# Patient Record
Sex: MALE | Race: BLACK OR AFRICAN AMERICAN | Employment: FULL TIME | URBAN - METROPOLITAN AREA
[De-identification: names, ages, dates, MRNs, and addresses within clinical notes are randomized per-mention and may not be internally consistent; named-entity substitution may affect disease eponyms.]

---

## 2019-10-23 ENCOUNTER — OP HISTORICAL/CONVERTED ENCOUNTER (OUTPATIENT)
Dept: OTHER | Age: 39
End: 2019-10-23

## 2020-06-16 ENCOUNTER — ED HISTORICAL/CONVERTED ENCOUNTER (OUTPATIENT)
Dept: OTHER | Age: 40
End: 2020-06-16

## 2020-06-24 ENCOUNTER — OP HISTORICAL/CONVERTED ENCOUNTER (OUTPATIENT)
Dept: OTHER | Age: 40
End: 2020-06-24

## 2021-06-02 ENCOUNTER — APPOINTMENT (OUTPATIENT)
Dept: CT IMAGING | Age: 41
DRG: 871 | End: 2021-06-02
Attending: EMERGENCY MEDICINE
Payer: OTHER GOVERNMENT

## 2021-06-02 ENCOUNTER — APPOINTMENT (OUTPATIENT)
Dept: GENERAL RADIOLOGY | Age: 41
DRG: 871 | End: 2021-06-02
Attending: EMERGENCY MEDICINE
Payer: OTHER GOVERNMENT

## 2021-06-02 ENCOUNTER — APPOINTMENT (OUTPATIENT)
Dept: NUCLEAR MEDICINE | Age: 41
DRG: 871 | End: 2021-06-02
Attending: EMERGENCY MEDICINE
Payer: OTHER GOVERNMENT

## 2021-06-02 ENCOUNTER — HOSPITAL ENCOUNTER (INPATIENT)
Age: 41
LOS: 4 days | Discharge: HOME | DRG: 871 | End: 2021-06-06
Attending: EMERGENCY MEDICINE | Admitting: HOSPITALIST
Payer: OTHER GOVERNMENT

## 2021-06-02 DIAGNOSIS — N17.9 ACUTE RENAL FAILURE, UNSPECIFIED ACUTE RENAL FAILURE TYPE (HCC): ICD-10-CM

## 2021-06-02 DIAGNOSIS — U07.1 COVID-19: Primary | ICD-10-CM

## 2021-06-02 PROBLEM — J12.82 PNEUMONIA DUE TO COVID-19 VIRUS: Status: ACTIVE | Noted: 2021-06-02

## 2021-06-02 PROBLEM — J96.90 RESPIRATORY FAILURE (HCC): Status: ACTIVE | Noted: 2021-06-02

## 2021-06-02 LAB
ALBUMIN SERPL-MCNC: 3 G/DL (ref 3.5–5)
ALBUMIN/GLOB SERPL: 0.6 {RATIO} (ref 1.1–2.2)
ALP SERPL-CCNC: 52 U/L (ref 45–117)
ALT SERPL-CCNC: 98 U/L (ref 12–78)
ANION GAP SERPL CALC-SCNC: 9 MMOL/L (ref 5–15)
APTT PPP: 35 SEC (ref 21.2–34.1)
AST SERPL W P-5'-P-CCNC: 236 U/L (ref 15–37)
BASOPHILS # BLD: 0 K/UL (ref 0–0.1)
BASOPHILS NFR BLD: 0 % (ref 0–1)
BILIRUB SERPL-MCNC: 0.7 MG/DL (ref 0.2–1)
BNP SERPL-MCNC: 50 PG/ML
BUN SERPL-MCNC: 32 MG/DL (ref 6–20)
BUN/CREAT SERPL: 6 (ref 12–20)
CA-I BLD-MCNC: 8.1 MG/DL (ref 8.5–10.1)
CHLORIDE SERPL-SCNC: 99 MMOL/L (ref 97–108)
CO2 SERPL-SCNC: 26 MMOL/L (ref 21–32)
CREAT SERPL-MCNC: 5 MG/DL (ref 0.7–1.3)
D DIMER PPP FEU-MCNC: 1.04 UG/ML(FEU)
DIFFERENTIAL METHOD BLD: ABNORMAL
EOSINOPHIL # BLD: 0 K/UL (ref 0–0.4)
EOSINOPHIL NFR BLD: 0 % (ref 0–7)
ERYTHROCYTE [DISTWIDTH] IN BLOOD BY AUTOMATED COUNT: 14.5 % (ref 11.5–14.5)
GLOBULIN SER CALC-MCNC: 4.9 G/DL (ref 2–4)
GLUCOSE SERPL-MCNC: 88 MG/DL (ref 65–100)
HCT VFR BLD AUTO: 44.4 % (ref 36.6–50.3)
HGB BLD-MCNC: 15.5 G/DL (ref 12.1–17)
IMM GRANULOCYTES # BLD AUTO: 0.1 K/UL (ref 0–0.04)
IMM GRANULOCYTES NFR BLD AUTO: 1 % (ref 0–0.5)
INR PPP: 1.1 (ref 0.9–1.1)
LACTATE SERPL-SCNC: 1.2 MMOL/L (ref 0.4–2)
LDH SERPL L TO P-CCNC: 929 U/L (ref 85–241)
LYMPHOCYTES # BLD: 1.2 K/UL (ref 0.8–3.5)
LYMPHOCYTES NFR BLD: 11 % (ref 12–49)
MCH RBC QN AUTO: 28.7 PG (ref 26–34)
MCHC RBC AUTO-ENTMCNC: 34.9 G/DL (ref 30–36.5)
MCV RBC AUTO: 82.2 FL (ref 80–99)
MONOCYTES # BLD: 0.6 K/UL (ref 0–1)
MONOCYTES NFR BLD: 5 % (ref 5–13)
NEUTS SEG # BLD: 8.7 K/UL (ref 1.8–8)
NEUTS SEG NFR BLD: 83 % (ref 32–75)
NRBC # BLD: 0 K/UL (ref 0–0.01)
NRBC BLD-RTO: 0 PER 100 WBC
PLATELET # BLD AUTO: 175 K/UL (ref 150–400)
POTASSIUM SERPL-SCNC: 4.4 MMOL/L (ref 3.5–5.1)
PROCALCITONIN SERPL-MCNC: 1.63 NG/ML
PROT SERPL-MCNC: 7.9 G/DL (ref 6.4–8.2)
PROTHROMBIN TIME: 14.2 SEC (ref 11.9–14.7)
RBC # BLD AUTO: 5.4 M/UL (ref 4.1–5.7)
SODIUM SERPL-SCNC: 134 MMOL/L (ref 136–145)
THERAPEUTIC RANGE,PTTT: ABNORMAL SEC (ref 82–109)
TROPONIN I SERPL-MCNC: <0.05 NG/ML
WBC # BLD AUTO: 10.5 K/UL (ref 4.1–11.1)

## 2021-06-02 PROCEDURE — 80053 COMPREHEN METABOLIC PANEL: CPT

## 2021-06-02 PROCEDURE — 99284 EMERGENCY DEPT VISIT MOD MDM: CPT

## 2021-06-02 PROCEDURE — 65270000029 HC RM PRIVATE

## 2021-06-02 PROCEDURE — 83615 LACTATE (LD) (LDH) ENZYME: CPT

## 2021-06-02 PROCEDURE — 84484 ASSAY OF TROPONIN QUANT: CPT

## 2021-06-02 PROCEDURE — 36415 COLL VENOUS BLD VENIPUNCTURE: CPT

## 2021-06-02 PROCEDURE — 78580 LUNG PERFUSION IMAGING: CPT

## 2021-06-02 PROCEDURE — 80069 RENAL FUNCTION PANEL: CPT

## 2021-06-02 PROCEDURE — 96374 THER/PROPH/DIAG INJ IV PUSH: CPT

## 2021-06-02 PROCEDURE — A9540 TC99M MAA: HCPCS

## 2021-06-02 PROCEDURE — 71045 X-RAY EXAM CHEST 1 VIEW: CPT

## 2021-06-02 PROCEDURE — 82550 ASSAY OF CK (CPK): CPT

## 2021-06-02 PROCEDURE — 85610 PROTHROMBIN TIME: CPT

## 2021-06-02 PROCEDURE — 80061 LIPID PANEL: CPT

## 2021-06-02 PROCEDURE — 85025 COMPLETE CBC W/AUTO DIFF WBC: CPT

## 2021-06-02 PROCEDURE — 82728 ASSAY OF FERRITIN: CPT

## 2021-06-02 PROCEDURE — 74011250636 HC RX REV CODE- 250/636: Performed by: EMERGENCY MEDICINE

## 2021-06-02 PROCEDURE — 87040 BLOOD CULTURE FOR BACTERIA: CPT

## 2021-06-02 PROCEDURE — 84145 PROCALCITONIN (PCT): CPT

## 2021-06-02 PROCEDURE — 85379 FIBRIN DEGRADATION QUANT: CPT

## 2021-06-02 PROCEDURE — 74011250637 HC RX REV CODE- 250/637: Performed by: EMERGENCY MEDICINE

## 2021-06-02 PROCEDURE — 85730 THROMBOPLASTIN TIME PARTIAL: CPT

## 2021-06-02 PROCEDURE — 93005 ELECTROCARDIOGRAM TRACING: CPT

## 2021-06-02 PROCEDURE — 83880 ASSAY OF NATRIURETIC PEPTIDE: CPT

## 2021-06-02 PROCEDURE — 83605 ASSAY OF LACTIC ACID: CPT

## 2021-06-02 RX ORDER — ONDANSETRON 2 MG/ML
4 INJECTION INTRAMUSCULAR; INTRAVENOUS
Status: DISCONTINUED | OUTPATIENT
Start: 2021-06-02 | End: 2021-06-06 | Stop reason: HOSPADM

## 2021-06-02 RX ORDER — ACETAMINOPHEN 500 MG
1000 TABLET ORAL ONCE
Status: COMPLETED | OUTPATIENT
Start: 2021-06-02 | End: 2021-06-02

## 2021-06-02 RX ORDER — HEPARIN SODIUM 5000 [USP'U]/ML
5000 INJECTION, SOLUTION INTRAVENOUS; SUBCUTANEOUS EVERY 8 HOURS
Status: DISCONTINUED | OUTPATIENT
Start: 2021-06-02 | End: 2021-06-03

## 2021-06-02 RX ORDER — SODIUM CHLORIDE 0.9 % (FLUSH) 0.9 %
5-40 SYRINGE (ML) INJECTION AS NEEDED
Status: DISCONTINUED | OUTPATIENT
Start: 2021-06-02 | End: 2021-06-06 | Stop reason: HOSPADM

## 2021-06-02 RX ORDER — SODIUM CHLORIDE 0.9 % (FLUSH) 0.9 %
5-10 SYRINGE (ML) INJECTION AS NEEDED
Status: DISCONTINUED | OUTPATIENT
Start: 2021-06-02 | End: 2021-06-06 | Stop reason: HOSPADM

## 2021-06-02 RX ORDER — ONDANSETRON 4 MG/1
4 TABLET, ORALLY DISINTEGRATING ORAL
Status: DISCONTINUED | OUTPATIENT
Start: 2021-06-02 | End: 2021-06-06 | Stop reason: HOSPADM

## 2021-06-02 RX ORDER — ACETAMINOPHEN 650 MG/1
650 SUPPOSITORY RECTAL
Status: DISCONTINUED | OUTPATIENT
Start: 2021-06-02 | End: 2021-06-03

## 2021-06-02 RX ORDER — DEXAMETHASONE 4 MG/1
6 TABLET ORAL DAILY
Status: DISCONTINUED | OUTPATIENT
Start: 2021-06-02 | End: 2021-06-03

## 2021-06-02 RX ORDER — DEXAMETHASONE SODIUM PHOSPHATE 4 MG/ML
10 INJECTION, SOLUTION INTRA-ARTICULAR; INTRALESIONAL; INTRAMUSCULAR; INTRAVENOUS; SOFT TISSUE ONCE
Status: COMPLETED | OUTPATIENT
Start: 2021-06-02 | End: 2021-06-02

## 2021-06-02 RX ORDER — SODIUM CHLORIDE 0.9 % (FLUSH) 0.9 %
5-40 SYRINGE (ML) INJECTION EVERY 8 HOURS
Status: DISCONTINUED | OUTPATIENT
Start: 2021-06-02 | End: 2021-06-06 | Stop reason: HOSPADM

## 2021-06-02 RX ORDER — ACETAMINOPHEN 325 MG/1
650 TABLET ORAL
Status: DISCONTINUED | OUTPATIENT
Start: 2021-06-02 | End: 2021-06-03

## 2021-06-02 RX ADMIN — DEXAMETHASONE SODIUM PHOSPHATE 10 MG: 4 INJECTION, SOLUTION INTRA-ARTICULAR; INTRALESIONAL; INTRAMUSCULAR; INTRAVENOUS; SOFT TISSUE at 17:18

## 2021-06-02 RX ADMIN — ACETAMINOPHEN 1000 MG: 500 TABLET ORAL at 17:12

## 2021-06-02 RX ADMIN — Medication 5 ML: at 17:14

## 2021-06-02 RX ADMIN — SODIUM CHLORIDE 1000 ML: 9 INJECTION, SOLUTION INTRAVENOUS at 17:09

## 2021-06-02 NOTE — ED PROVIDER NOTES
EMERGENCY DEPARTMENT HISTORY AND PHYSICAL EXAM        Date: 6/2/2021  Patient Name: Jo Ann Murray    History of Presenting Illness     Chief Complaint   Patient presents with    Positive For Covid-19    Shortness of Breath       History Provided By: Patient    HPI: Jo Ann Murray, 47 y.o. male with history of hypertension who presents with difficulty breathing and chest pain. States that he was diagnosed with Covid 2 days ago. His symptoms started about 6 days ago with decreased taste. He then developed fever and chills. He has had mild cough and shortness of breath as well. States that he developed chest pain 2 days ago. Pain is in the center of his chest, nonradiating achy, moderate, and without exacerbating symptoms. He did have tingling in his hands as well that started in the last couple days. Denies history of DVT/PE, leg edema, or other symptoms. PCP: None        Past History     Past Medical History:  Past Medical History:   Diagnosis Date    Arthritis     Hypertension        Past Surgical History:  History reviewed. No pertinent surgical history. Family History:  History reviewed. No pertinent family history. Social History:  Social History     Tobacco Use    Smoking status: Never Smoker    Smokeless tobacco: Never Used   Substance Use Topics    Alcohol use: Not on file    Drug use: Not on file       Allergies:  No Known Allergies      Review of Systems   Review of Systems   Constitutional: Positive for chills. Negative for fever. HENT: Negative for congestion. Eyes: Negative for visual disturbance. Respiratory: Positive for cough and shortness of breath. Cardiovascular: Positive for chest pain. Gastrointestinal: Negative for abdominal pain. Genitourinary: Negative for dysuria. Musculoskeletal: Negative for arthralgias. Skin: Negative for rash. Neurological: Negative for headaches. Physical Exam   Constitutional: No acute distress. Well-nourished.   Skin: No rash.  ENT: No rhinorrhea. No cough. Head is normocephalic and atraumatic. Eye: No proptosis or conjunctival injections. Respiratory: No apparent respiratory distress. Lung sounds are clear. Gastrointestinal: Nondistended. Musculoskeletal: No obvious bony deformities. No leg edema. Psychiatric: Cooperative. Appropriate mood and affect. Diagnostic Study Results     Labs -     Recent Results (from the past 24 hour(s))   METABOLIC PANEL, COMPREHENSIVE    Collection Time: 06/02/21  4:30 PM   Result Value Ref Range    Sodium 134 (L) 136 - 145 mmol/L    Potassium 4.4 3.5 - 5.1 mmol/L    Chloride 99 97 - 108 mmol/L    CO2 26 21 - 32 mmol/L    Anion gap 9 5 - 15 mmol/L    Glucose 88 65 - 100 mg/dL    BUN 32 (H) 6 - 20 mg/dL    Creatinine 5.00 (H) 0.70 - 1.30 mg/dL    BUN/Creatinine ratio 6 (L) 12 - 20      GFR est AA 16 (L) >60 ml/min/1.73m2    GFR est non-AA 13 (L) >60 ml/min/1.73m2    Calcium 8.1 (L) 8.5 - 10.1 mg/dL    Bilirubin, total 0.7 0.2 - 1.0 mg/dL    AST (SGOT) 236 (H) 15 - 37 U/L    ALT (SGPT) 98 (H) 12 - 78 U/L    Alk. phosphatase 52 45 - 117 U/L    Protein, total 7.9 6.4 - 8.2 g/dL    Albumin 3.0 (L) 3.5 - 5.0 g/dL    Globulin 4.9 (H) 2.0 - 4.0 g/dL    A-G Ratio 0.6 (L) 1.1 - 2.2     CBC WITH AUTOMATED DIFF    Collection Time: 06/02/21  4:30 PM   Result Value Ref Range    WBC 10.5 4.1 - 11.1 K/uL    RBC 5.40 4. 10 - 5.70 M/uL    HGB 15.5 12.1 - 17.0 g/dL    HCT 44.4 36.6 - 50.3 %    MCV 82.2 80.0 - 99.0 FL    MCH 28.7 26.0 - 34.0 PG    MCHC 34.9 30.0 - 36.5 g/dL    RDW 14.5 11.5 - 14.5 %    PLATELET 243 647 - 276 K/uL    NRBC 0.0 0.0  WBC    ABSOLUTE NRBC 0.00 0.00 - 0.01 K/uL    NEUTROPHILS 83 (H) 32 - 75 %    LYMPHOCYTES 11 (L) 12 - 49 %    MONOCYTES 5 5 - 13 %    EOSINOPHILS 0 0 - 7 %    BASOPHILS 0 0 - 1 %    IMMATURE GRANULOCYTES 1 (H) 0 - 0.5 %    ABS. NEUTROPHILS 8.7 (H) 1.8 - 8.0 K/UL    ABS. LYMPHOCYTES 1.2 0.8 - 3.5 K/UL    ABS. MONOCYTES 0.6 0.0 - 1.0 K/UL    ABS.  EOSINOPHILS 0.0 0.0 - 0.4 K/UL    ABS. BASOPHILS 0.0 0.0 - 0.1 K/UL    ABS. IMM. GRANS. 0.1 (H) 0.00 - 0.04 K/UL    DF AUTOMATED     TROPONIN I    Collection Time: 06/02/21  4:30 PM   Result Value Ref Range    Troponin-I, Qt. <0.05 <0.05 ng/mL   LD    Collection Time: 06/02/21  4:30 PM   Result Value Ref Range     (H) 85 - 241 U/L   PROTHROMBIN TIME + INR    Collection Time: 06/02/21  4:30 PM   Result Value Ref Range    Prothrombin time 14.2 11.9 - 14.7 sec    INR 1.1 0.9 - 1.1     PTT    Collection Time: 06/02/21  4:30 PM   Result Value Ref Range    aPTT 35.0 (H) 21.2 - 34.1 sec    aPTT, therapeutic range   82 - 109 sec   BNP    Collection Time: 06/02/21  4:45 PM   Result Value Ref Range    NT pro-BNP 50 <125 pg/mL   D DIMER    Collection Time: 06/02/21  4:45 PM   Result Value Ref Range    D DIMER 1.04 (H) <0.50 ug/ml(FEU)   PROCALCITONIN    Collection Time: 06/02/21  4:45 PM   Result Value Ref Range    Procalcitonin 1.63 (H) 0 ng/mL   LACTIC ACID    Collection Time: 06/02/21  4:45 PM   Result Value Ref Range    Lactic acid 1.2 0.4 - 2.0 mmol/L       Radiologic Studies -   NM LUNG SCAN PERF   Final Result   Intermediate probability of pulmonary embolism. XR CHEST PORT   Final Result        CT Results  (Last 48 hours)    None        CXR Results  (Last 48 hours)               06/02/21 1644  XR CHEST PORT Final result    Narrative:  Chest single view. Hazy reticular markings predominant central lungs. Consider   infectious/inflammatory process. Cardiac and mediastinal structures magnified on   this AP view. No pneumothorax or sizable pleural effusion. Medical Decision Making and ED Course     I reviewed the available vital signs, nursing notes, past medical history, past surgical history, family history, and social history. Vital Signs - Reviewed the patient's vital signs.   Patient Vitals for the past 12 hrs:   Temp Pulse Resp BP SpO2   06/02/21 1755  94 (!) 40 (!) 144/79 95 %   06/02/21 1616     92 %   06/02/21 1609 (!) 100.9 °F (38.3 °C) 100 (!) 92 137/84 92 %       EKG interpretation: Obtained on 6/2/2021 at 1606. Read at 1616. Normal sinus rhythm at rate of 98 bpm.  WV interval 160 ms. QRS duration 92 ms. QTc 4 3 6 ms. Right axis deviation. Multiple T wave inversions. No ST segment abnormalities. Medical Decision Making:   Presented with chest pain difficulty breathing. Patient has known Covid 19. The differential diagnosis is COVID-19, pneumonia, ACS, pulmonary embolism. Work-up shows acute kidney injury. He did get a VQ scan to rule out a PE which showed intermediate risk. He will be anticoagulated for this. Unclear if he actually has a pulmonary embolism or not. He could not get a PET scan with CT due to his creatinine of 5. Patient was given IV fluids Decadron. He was admitted to the hospitalist.         Disposition     Admitted to Dr. Guanako Powell    Diagnosis     Clinical impression:   1. COVID-19    2. Acute renal failure, unspecified acute renal failure type [facility])           Attestation:  Please note that this dictation was completed with [employer], the computer voice recognition software. Quite often unanticipated grammatical, syntax, homophones, and other interpretive errors are inadvertently transcribed by the computer software. Please disregard these errors. Please excuse any errors that have escaped final proofreading. Thank you.   Raudel Walker, DO

## 2021-06-02 NOTE — ED TRIAGE NOTES
Patient was told he was covid positive on Monday.  Here because he feels short of breath and hands are tingling

## 2021-06-03 ENCOUNTER — APPOINTMENT (OUTPATIENT)
Dept: ULTRASOUND IMAGING | Age: 41
DRG: 871 | End: 2021-06-03
Attending: HOSPITALIST
Payer: OTHER GOVERNMENT

## 2021-06-03 LAB
25(OH)D3 SERPL-MCNC: <9 NG/ML (ref 30–100)
ALBUMIN SERPL-MCNC: 2.6 G/DL (ref 3.5–5)
ALBUMIN SERPL-MCNC: 2.7 G/DL (ref 3.5–5)
ALBUMIN/GLOB SERPL: 0.7 {RATIO} (ref 1.1–2.2)
ALP SERPL-CCNC: 54 U/L (ref 45–117)
ALT SERPL-CCNC: 98 U/L (ref 12–78)
AMPHET UR QL SCN: NEGATIVE
ANION GAP SERPL CALC-SCNC: 11 MMOL/L (ref 5–15)
ANION GAP SERPL CALC-SCNC: 9 MMOL/L (ref 5–15)
APPEARANCE UR: ABNORMAL
AST SERPL W P-5'-P-CCNC: 188 U/L (ref 15–37)
BACTERIA URNS QL MICRO: NEGATIVE /HPF
BARBITURATES UR QL SCN: NEGATIVE
BASOPHILS # BLD: 0 K/UL (ref 0–0.1)
BASOPHILS NFR BLD: 1 % (ref 0–1)
BENZODIAZ UR QL: NEGATIVE
BILIRUB SERPL-MCNC: 0.5 MG/DL (ref 0.2–1)
BILIRUB UR QL: NEGATIVE
BUN SERPL-MCNC: 37 MG/DL (ref 6–20)
BUN SERPL-MCNC: 44 MG/DL (ref 6–20)
BUN/CREAT SERPL: 7 (ref 12–20)
BUN/CREAT SERPL: 8 (ref 12–20)
CA-I BLD-MCNC: 7.8 MG/DL (ref 8.5–10.1)
CA-I BLD-MCNC: 7.8 MG/DL (ref 8.5–10.1)
CA-I BLD-MCNC: 8.2 MG/DL (ref 8.5–10.1)
CANNABINOIDS UR QL SCN: NEGATIVE
CHLORIDE SERPL-SCNC: 102 MMOL/L (ref 97–108)
CHLORIDE SERPL-SCNC: 102 MMOL/L (ref 97–108)
CHOLEST SERPL-MCNC: 118 MG/DL
CK SERPL-CCNC: 4840 U/L (ref 39–308)
CK SERPL-CCNC: 6267 U/L (ref 39–308)
CO2 SERPL-SCNC: 22 MMOL/L (ref 21–32)
CO2 SERPL-SCNC: 25 MMOL/L (ref 21–32)
COCAINE UR QL SCN: NEGATIVE
COLOR UR: ABNORMAL
COVID-19 RAPID TEST, COVR: NOT DETECTED
CREAT SERPL-MCNC: 5.55 MG/DL (ref 0.7–1.3)
CREAT SERPL-MCNC: 5.55 MG/DL (ref 0.7–1.3)
CREAT UR-MCNC: 249 MG/DL
CRP SERPL-MCNC: 29.1 MG/DL (ref 0–0.6)
D DIMER PPP FEU-MCNC: 0.97 UG/ML(FEU)
DIFFERENTIAL METHOD BLD: ABNORMAL
DRUG SCRN COMMENT,DRGCM: NORMAL
EOSINOPHIL # BLD: 0.1 K/UL (ref 0–0.4)
EOSINOPHIL NFR BLD: 1 % (ref 0–7)
ERYTHROCYTE [DISTWIDTH] IN BLOOD BY AUTOMATED COUNT: 17.7 % (ref 11.5–14.5)
FERRITIN SERPL-MCNC: 1523 NG/ML (ref 26–388)
FERRITIN SERPL-MCNC: 26 NG/ML (ref 26–388)
FIBRINOGEN PPP-MCNC: 791 MG/DL (ref 220–535)
GLOBULIN SER CALC-MCNC: 4 G/DL (ref 2–4)
GLUCOSE BLD STRIP.AUTO-MCNC: 124 MG/DL (ref 65–117)
GLUCOSE BLD STRIP.AUTO-MCNC: 159 MG/DL (ref 65–117)
GLUCOSE BLD STRIP.AUTO-MCNC: 176 MG/DL (ref 65–117)
GLUCOSE BLD STRIP.AUTO-MCNC: 200 MG/DL (ref 65–117)
GLUCOSE SERPL-MCNC: 115 MG/DL (ref 65–100)
GLUCOSE SERPL-MCNC: 125 MG/DL (ref 65–100)
GLUCOSE UR STRIP.AUTO-MCNC: NEGATIVE MG/DL
HCT VFR BLD AUTO: 37.1 % (ref 36.6–50.3)
HDLC SERPL-MCNC: 26 MG/DL
HDLC SERPL: 4.5 {RATIO} (ref 0–5)
HGB BLD-MCNC: 11.1 G/DL (ref 12.1–17)
HGB UR QL STRIP: ABNORMAL
IMM GRANULOCYTES # BLD AUTO: 0 K/UL (ref 0–0.04)
IMM GRANULOCYTES NFR BLD AUTO: 0 % (ref 0–0.5)
KETONES UR QL STRIP.AUTO: NEGATIVE MG/DL
LACTATE SERPL-SCNC: 1.2 MMOL/L (ref 0.4–2)
LDH SERPL L TO P-CCNC: 872 U/L (ref 85–241)
LDLC SERPL CALC-MCNC: 62 MG/DL (ref 0–100)
LEUKOCYTE ESTERASE UR QL STRIP.AUTO: NEGATIVE
LIPID PROFILE,FLP: ABNORMAL
LYMPHOCYTES # BLD: 1.8 K/UL (ref 0.8–3.5)
LYMPHOCYTES NFR BLD: 32 % (ref 12–49)
MAGNESIUM SERPL-MCNC: 1.9 MG/DL (ref 1.6–2.4)
MCH RBC QN AUTO: 24.9 PG (ref 26–34)
MCHC RBC AUTO-ENTMCNC: 29.9 G/DL (ref 30–36.5)
MCV RBC AUTO: 83.2 FL (ref 80–99)
METHADONE UR QL: NEGATIVE
MONOCYTES # BLD: 0.5 K/UL (ref 0–1)
MONOCYTES NFR BLD: 10 % (ref 5–13)
MUCOUS THREADS URNS QL MICRO: ABNORMAL /LPF
NEUTS SEG # BLD: 3.1 K/UL (ref 1.8–8)
NEUTS SEG NFR BLD: 56 % (ref 32–75)
NITRITE UR QL STRIP.AUTO: NEGATIVE
NRBC # BLD: 0.02 K/UL (ref 0–0.01)
NRBC BLD-RTO: 0.4 PER 100 WBC
OPIATES UR QL: NEGATIVE
PCP UR QL: NEGATIVE
PERFORMED BY, TECHID: ABNORMAL
PH UR STRIP: 5 [PH] (ref 5–8)
PHOSPHATE SERPL-MCNC: 4.4 MG/DL (ref 2.6–4.7)
PLATELET # BLD AUTO: 326 K/UL (ref 150–400)
PMV BLD AUTO: 10.7 FL (ref 8.9–12.9)
POTASSIUM SERPL-SCNC: 4.4 MMOL/L (ref 3.5–5.1)
POTASSIUM SERPL-SCNC: 4.7 MMOL/L (ref 3.5–5.1)
POTASSIUM UR-SCNC: 22 MMOL/L
PROT SERPL-MCNC: 6.7 G/DL (ref 6.4–8.2)
PROT UR STRIP-MCNC: 100 MG/DL
PTH-INTACT SERPL-MCNC: 251.7 PG/ML (ref 18.4–88)
RBC # BLD AUTO: 4.46 M/UL (ref 4.1–5.7)
RBC #/AREA URNS HPF: ABNORMAL /HPF (ref 0–5)
SARS-COV-2, COV2: NORMAL
SODIUM SERPL-SCNC: 135 MMOL/L (ref 136–145)
SODIUM SERPL-SCNC: 136 MMOL/L (ref 136–145)
SODIUM UR-SCNC: 20 MMOL/L
SP GR UR REFRACTOMETRY: 1.01 (ref 1–1.03)
SPECIMEN SOURCE: NORMAL
TRIGL SERPL-MCNC: 150 MG/DL (ref ?–150)
TROPONIN I SERPL-MCNC: <0.05 NG/ML
TSH SERPL DL<=0.05 MIU/L-ACNC: 0.77 UIU/ML (ref 0.36–3.74)
UROBILINOGEN UR QL STRIP.AUTO: 0.1 EU/DL (ref 0.1–1)
VLDLC SERPL CALC-MCNC: 30 MG/DL
WBC # BLD AUTO: 5.5 K/UL (ref 4.1–11.1)
WBC URNS QL MICRO: ABNORMAL /HPF (ref 0–4)

## 2021-06-03 PROCEDURE — 81001 URINALYSIS AUTO W/SCOPE: CPT

## 2021-06-03 PROCEDURE — 65270000029 HC RM PRIVATE

## 2021-06-03 PROCEDURE — 74011250636 HC RX REV CODE- 250/636: Performed by: HOSPITALIST

## 2021-06-03 PROCEDURE — 83605 ASSAY OF LACTIC ACID: CPT

## 2021-06-03 PROCEDURE — 74011250636 HC RX REV CODE- 250/636: Performed by: INTERNAL MEDICINE

## 2021-06-03 PROCEDURE — 84484 ASSAY OF TROPONIN QUANT: CPT

## 2021-06-03 PROCEDURE — 82306 VITAMIN D 25 HYDROXY: CPT

## 2021-06-03 PROCEDURE — 83735 ASSAY OF MAGNESIUM: CPT

## 2021-06-03 PROCEDURE — 82550 ASSAY OF CK (CPK): CPT

## 2021-06-03 PROCEDURE — 74011250637 HC RX REV CODE- 250/637: Performed by: INTERNAL MEDICINE

## 2021-06-03 PROCEDURE — 74011636637 HC RX REV CODE- 636/637: Performed by: HOSPITALIST

## 2021-06-03 PROCEDURE — 80053 COMPREHEN METABOLIC PANEL: CPT

## 2021-06-03 PROCEDURE — 84133 ASSAY OF URINE POTASSIUM: CPT

## 2021-06-03 PROCEDURE — 85025 COMPLETE CBC W/AUTO DIFF WBC: CPT

## 2021-06-03 PROCEDURE — 83615 LACTATE (LD) (LDH) ENZYME: CPT

## 2021-06-03 PROCEDURE — 36415 COLL VENOUS BLD VENIPUNCTURE: CPT

## 2021-06-03 PROCEDURE — 83970 ASSAY OF PARATHORMONE: CPT

## 2021-06-03 PROCEDURE — 87635 SARS-COV-2 COVID-19 AMP PRB: CPT

## 2021-06-03 PROCEDURE — 86140 C-REACTIVE PROTEIN: CPT

## 2021-06-03 PROCEDURE — 82570 ASSAY OF URINE CREATININE: CPT

## 2021-06-03 PROCEDURE — 82728 ASSAY OF FERRITIN: CPT

## 2021-06-03 PROCEDURE — 85384 FIBRINOGEN ACTIVITY: CPT

## 2021-06-03 PROCEDURE — 84300 ASSAY OF URINE SODIUM: CPT

## 2021-06-03 PROCEDURE — 82962 GLUCOSE BLOOD TEST: CPT

## 2021-06-03 PROCEDURE — 85379 FIBRIN DEGRADATION QUANT: CPT

## 2021-06-03 PROCEDURE — 76770 US EXAM ABDO BACK WALL COMP: CPT

## 2021-06-03 PROCEDURE — 84443 ASSAY THYROID STIM HORMONE: CPT

## 2021-06-03 PROCEDURE — 74011000250 HC RX REV CODE- 250: Performed by: HOSPITALIST

## 2021-06-03 PROCEDURE — 94760 N-INVAS EAR/PLS OXIMETRY 1: CPT

## 2021-06-03 PROCEDURE — 80307 DRUG TEST PRSMV CHEM ANLYZR: CPT

## 2021-06-03 RX ORDER — DEXTROSE 50 % IN WATER (D50W) INTRAVENOUS SYRINGE
25-50 AS NEEDED
Status: DISCONTINUED | OUTPATIENT
Start: 2021-06-03 | End: 2021-06-06 | Stop reason: HOSPADM

## 2021-06-03 RX ORDER — MELATONIN
2000 DAILY
Status: DISCONTINUED | OUTPATIENT
Start: 2021-06-03 | End: 2021-06-04

## 2021-06-03 RX ORDER — SODIUM CHLORIDE 9 MG/ML
100 INJECTION, SOLUTION INTRAVENOUS CONTINUOUS
Status: DISCONTINUED | OUTPATIENT
Start: 2021-06-03 | End: 2021-06-03

## 2021-06-03 RX ORDER — ASCORBIC ACID 500 MG
500 TABLET ORAL 2 TIMES DAILY
Status: DISCONTINUED | OUTPATIENT
Start: 2021-06-03 | End: 2021-06-06 | Stop reason: HOSPADM

## 2021-06-03 RX ORDER — SODIUM CHLORIDE 9 MG/ML
200 INJECTION, SOLUTION INTRAVENOUS CONTINUOUS
Status: DISPENSED | OUTPATIENT
Start: 2021-06-03 | End: 2021-06-04

## 2021-06-03 RX ORDER — ZINC GLUCONATE 50 MG
1 TABLET ORAL DAILY
Status: DISCONTINUED | OUTPATIENT
Start: 2021-06-03 | End: 2021-06-06 | Stop reason: HOSPADM

## 2021-06-03 RX ORDER — INSULIN LISPRO 100 [IU]/ML
INJECTION, SOLUTION INTRAVENOUS; SUBCUTANEOUS
Status: DISCONTINUED | OUTPATIENT
Start: 2021-06-03 | End: 2021-06-06 | Stop reason: HOSPADM

## 2021-06-03 RX ORDER — MAGNESIUM SULFATE 100 %
4 CRYSTALS MISCELLANEOUS AS NEEDED
Status: DISCONTINUED | OUTPATIENT
Start: 2021-06-03 | End: 2021-06-06 | Stop reason: HOSPADM

## 2021-06-03 RX ADMIN — SODIUM BICARBONATE: 84 INJECTION, SOLUTION INTRAVENOUS at 06:01

## 2021-06-03 RX ADMIN — SODIUM CHLORIDE 100 ML/HR: 9 INJECTION, SOLUTION INTRAVENOUS at 08:46

## 2021-06-03 RX ADMIN — SODIUM CHLORIDE 200 ML/HR: 9 INJECTION, SOLUTION INTRAVENOUS at 22:08

## 2021-06-03 RX ADMIN — AZITHROMYCIN 500 MG: 500 INJECTION, POWDER, LYOPHILIZED, FOR SOLUTION INTRAVENOUS at 11:50

## 2021-06-03 RX ADMIN — APIXABAN 5 MG: 2.5 TABLET, FILM COATED ORAL at 08:41

## 2021-06-03 RX ADMIN — Medication 2000 UNITS: at 11:51

## 2021-06-03 RX ADMIN — DEXAMETHASONE 6 MG: 4 TABLET ORAL at 02:15

## 2021-06-03 RX ADMIN — Medication 50 MG: at 11:50

## 2021-06-03 RX ADMIN — SODIUM CHLORIDE 1000 ML: 9 INJECTION, SOLUTION INTRAVENOUS at 02:35

## 2021-06-03 RX ADMIN — Medication 10 ML: at 15:48

## 2021-06-03 RX ADMIN — OXYCODONE HYDROCHLORIDE AND ACETAMINOPHEN 500 MG: 500 TABLET ORAL at 22:05

## 2021-06-03 RX ADMIN — INSULIN LISPRO 2 UNITS: 100 INJECTION, SOLUTION INTRAVENOUS; SUBCUTANEOUS at 11:30

## 2021-06-03 RX ADMIN — Medication 10 ML: at 04:08

## 2021-06-03 RX ADMIN — APIXABAN 5 MG: 2.5 TABLET, FILM COATED ORAL at 22:06

## 2021-06-03 RX ADMIN — Medication 10 ML: at 06:01

## 2021-06-03 RX ADMIN — INSULIN LISPRO 3 UNITS: 100 INJECTION, SOLUTION INTRAVENOUS; SUBCUTANEOUS at 08:41

## 2021-06-03 RX ADMIN — HEPARIN SODIUM 5000 UNITS: 5000 INJECTION INTRAVENOUS; SUBCUTANEOUS at 02:17

## 2021-06-03 RX ADMIN — SODIUM CHLORIDE 1000 ML: 9 INJECTION, SOLUTION INTRAVENOUS at 12:55

## 2021-06-03 RX ADMIN — OXYCODONE HYDROCHLORIDE AND ACETAMINOPHEN 500 MG: 500 TABLET ORAL at 11:51

## 2021-06-03 NOTE — ROUTINE PROCESS
TRANSFER - OUT REPORT: 
 
Verbal report given to Rn Virgen(name) on Kacey Hicks  being transferred to [facility](unit) for routine progression of care Report consisted of patients Situation, Background, Assessment and  
Recommendations(SBAR). Information from the following report(s) SBAR, Kardex and Recent Results was reviewed with the receiving nurse. Lines:  
Peripheral IV 06/02/21 Right Antecubital (Active) Site Assessment Clean, dry, & intact 06/02/21 1653 Phlebitis Assessment 0 06/02/21 1653 Infiltration Assessment 0 06/02/21 1653 Dressing Status Clean, dry, & intact 06/02/21 1653 Dressing Type Transparent 06/02/21 1653 Hub Color/Line Status Blue;Flushed 06/02/21 1653 Action Taken Blood drawn 06/02/21 1653 Alcohol Cap Used Yes 06/02/21 1653 Opportunity for questions and clarification was provided. Patient transported with: 
 Monitor Tech

## 2021-06-03 NOTE — PROGRESS NOTES
Hospitalist Progress Note         Danielle Delgado MD          Daily Progress Note: 6/3/2021      Subjective: The patient is seen for follow  up. 82-year-old gentleman admitted for progressive worsening shortness of breath. Newly diagnosed 2 days ago with COVID-19. Notes nonproductive cough without fevers or chills. Chest x-ray showed increased reticular markings. Patient was not hypoxic upon admission. Currently on room air. Problem List:  Problem List as of 6/3/2021 Never Reviewed        Codes Class Noted - Resolved    Pneumonia due to COVID-19 virus ICD-10-CM: U07.1, J12.82  ICD-9-CM: 480.8, 079.89  6/2/2021 - Present        Respiratory failure ([facility] [address] ICD-10-CM: J96.90  ICD-9-CM: 518.81  6/2/2021 - Present              Medications reviewed  Current Facility-Administered Medications   Medication Dose Route Frequency    insulin lispro (HUMALOG) injection   SubCUTAneous AC&HS    glucose chewable tablet 16 g  4 Tablet Oral PRN    dextrose (D50W) injection syrg 12.5-25 g  25-50 mL IntraVENous PRN    glucagon (GLUCAGEN) injection 1 mg  1 mg IntraMUSCular PRN    apixaban (ELIQUIS) tablet 5 mg  5 mg Oral Q12H    0.9% sodium chloride infusion  100 mL/hr IntraVENous CONTINUOUS    sodium chloride (NS) flush 5-10 mL  5-10 mL IntraVENous PRN    sodium chloride (NS) flush 5-40 mL  5-40 mL IntraVENous Q8H    sodium chloride (NS) flush 5-40 mL  5-40 mL IntraVENous PRN    ondansetron (ZOFRAN ODT) tablet 4 mg  4 mg Oral Q6H PRN    Or    ondansetron (ZOFRAN) injection 4 mg  4 mg IntraVENous Q6H PRN    acetaminophen (TYLENOL) tablet 650 mg  650 mg Oral Q6H PRN    Or    acetaminophen (TYLENOL) suppository 650 mg  650 mg Rectal Q6H PRN    dexAMETHasone (DECADRON) tablet 6 mg  6 mg Oral DAILY       Review of Systems:   A comprehensive review of systems was negative except for that written in the HPI.     Objective:   Physical Exam:     Visit Vitals  BP (!) 140/89 (BP 1 Location: Left upper arm, BP Patient Position: At rest;Standing)   Pulse 74   Temp 97.5 °F (36.4 °C)   Resp 18   Ht 6' 3\" (1.905 m)   Wt 140.6 kg (310 lb)   SpO2 91%   BMI 38.75 kg/m²      O2 Device: None (Room air)    Temp (24hrs), Av °F (37.2 °C), Min:97.5 °F (36.4 °C), Max:100.9 °F (38.3 °C)    No intake/output data recorded. No intake/output data recorded. General:  Alert, cooperative, no distress, appears stated age. Lungs:   Clear to auscultation bilaterally. Chest wall:  No tenderness or deformity. Heart:  Regular rate and rhythm, S1, S2 normal, no murmur, click, rub or gallop. Abdomen:   Soft, non-tender. Bowel sounds normal. No masses,  No organomegaly. Extremities: Extremities normal, atraumatic, no cyanosis or edema. Pulses: 2+ and symmetric all extremities. Skin: Skin color, texture, turgor normal. No rashes or lesions   Neurologic: CNII-XII intact. No gross sensory or motor deficits     Data Review:       Recent Days:  Recent Labs     21  0340 21  1630   WBC 5.5 10.5   HGB 11.1* 15.5   HCT 37.1 44.4    175     Recent Labs     21  0340 21  2255 21  1630   * 136 134*   K 4.7 4.4 4.4    102 99   CO2 22 25 26   * 115* 88   BUN 44* 37* 32*   CREA 5.55* 5.55* 5.00*   CA 7.8* 8.2* 8.1*   MG 1.9  --   --    PHOS  --  4.4  --    ALB 2.7* 2.6* 3.0*   TBILI 0.5  --  0.7   ALT 98*  --  98*   INR  --   --  1.1     No results for input(s): PH, PCO2, PO2, HCO3, FIO2 in the last 72 hours.     24 Hour Results:  Recent Results (from the past 24 hour(s))   METABOLIC PANEL, COMPREHENSIVE    Collection Time: 21  4:30 PM   Result Value Ref Range    Sodium 134 (L) 136 - 145 mmol/L    Potassium 4.4 3.5 - 5.1 mmol/L    Chloride 99 97 - 108 mmol/L    CO2 26 21 - 32 mmol/L    Anion gap 9 5 - 15 mmol/L    Glucose 88 65 - 100 mg/dL    BUN 32 (H) 6 - 20 mg/dL    Creatinine 5.00 (H) 0.70 - 1.30 mg/dL    BUN/Creatinine ratio 6 (L) 12 - 20      GFR est AA 16 (L) >60 ml/min/1.73m2    GFR est non-AA 13 (L) >60 ml/min/1.73m2    Calcium 8.1 (L) 8.5 - 10.1 mg/dL    Bilirubin, total 0.7 0.2 - 1.0 mg/dL    AST (SGOT) 236 (H) 15 - 37 U/L    ALT (SGPT) 98 (H) 12 - 78 U/L    Alk. phosphatase 52 45 - 117 U/L    Protein, total 7.9 6.4 - 8.2 g/dL    Albumin 3.0 (L) 3.5 - 5.0 g/dL    Globulin 4.9 (H) 2.0 - 4.0 g/dL    A-G Ratio 0.6 (L) 1.1 - 2.2     CBC WITH AUTOMATED DIFF    Collection Time: 06/02/21  4:30 PM   Result Value Ref Range    WBC 10.5 4.1 - 11.1 K/uL    RBC 5.40 4. 10 - 5.70 M/uL    HGB 15.5 12.1 - 17.0 g/dL    HCT 44.4 36.6 - 50.3 %    MCV 82.2 80.0 - 99.0 FL    MCH 28.7 26.0 - 34.0 PG    MCHC 34.9 30.0 - 36.5 g/dL    RDW 14.5 11.5 - 14.5 %    PLATELET 271 097 - 596 K/uL    NRBC 0.0 0.0  WBC    ABSOLUTE NRBC 0.00 0.00 - 0.01 K/uL    NEUTROPHILS 83 (H) 32 - 75 %    LYMPHOCYTES 11 (L) 12 - 49 %    MONOCYTES 5 5 - 13 %    EOSINOPHILS 0 0 - 7 %    BASOPHILS 0 0 - 1 %    IMMATURE GRANULOCYTES 1 (H) 0 - 0.5 %    ABS. NEUTROPHILS 8.7 (H) 1.8 - 8.0 K/UL    ABS. LYMPHOCYTES 1.2 0.8 - 3.5 K/UL    ABS. MONOCYTES 0.6 0.0 - 1.0 K/UL    ABS. EOSINOPHILS 0.0 0.0 - 0.4 K/UL    ABS. BASOPHILS 0.0 0.0 - 0.1 K/UL    ABS. IMM.  GRANS. 0.1 (H) 0.00 - 0.04 K/UL    DF AUTOMATED     TROPONIN I    Collection Time: 06/02/21  4:30 PM   Result Value Ref Range    Troponin-I, Qt. <0.05 <0.05 ng/mL   LD    Collection Time: 06/02/21  4:30 PM   Result Value Ref Range     (H) 85 - 241 U/L   PROTHROMBIN TIME + INR    Collection Time: 06/02/21  4:30 PM   Result Value Ref Range    Prothrombin time 14.2 11.9 - 14.7 sec    INR 1.1 0.9 - 1.1     PTT    Collection Time: 06/02/21  4:30 PM   Result Value Ref Range    aPTT 35.0 (H) 21.2 - 34.1 sec    aPTT, therapeutic range   82 - 109 sec   BNP    Collection Time: 06/02/21  4:45 PM   Result Value Ref Range    NT pro-BNP 50 <125 pg/mL   D DIMER    Collection Time: 06/02/21  4:45 PM   Result Value Ref Range    D DIMER 1.04 (H) <0.50 ug/ml(FEU)   PROCALCITONIN Collection Time: 06/02/21  4:45 PM   Result Value Ref Range    Procalcitonin 1.63 (H) 0 ng/mL   LACTIC ACID    Collection Time: 06/02/21  4:45 PM   Result Value Ref Range    Lactic acid 1.2 0.4 - 2.0 mmol/L   RENAL FUNCTION PANEL    Collection Time: 06/02/21 10:55 PM   Result Value Ref Range    Sodium 136 136 - 145 mmol/L    Potassium 4.4 3.5 - 5.1 mmol/L    Chloride 102 97 - 108 mmol/L    CO2 25 21 - 32 mmol/L    Anion gap 9 5 - 15 mmol/L    Glucose 115 (H) 65 - 100 mg/dL    BUN 37 (H) 6 - 20 mg/dL    Creatinine 5.55 (H) 0.70 - 1.30 mg/dL    BUN/Creatinine ratio 7 (L) 12 - 20      GFR est AA 14 (L) >60 ml/min/1.73m2    GFR est non-AA 12 (L) >60 ml/min/1.73m2    Calcium 8.2 (L) 8.5 - 10.1 mg/dL    Phosphorus 4.4 2.6 - 4.7 mg/dL    Albumin 2.6 (L) 3.5 - 5.0 g/dL   CK    Collection Time: 06/02/21 10:55 PM   Result Value Ref Range    CK 6,267 (H) 39 - 308 U/L   CBC WITH AUTOMATED DIFF    Collection Time: 06/03/21  3:40 AM   Result Value Ref Range    WBC 5.5 4.1 - 11.1 K/uL    RBC 4.46 4.10 - 5.70 M/uL    HGB 11.1 (L) 12.1 - 17.0 g/dL    HCT 37.1 36.6 - 50.3 %    MCV 83.2 80.0 - 99.0 FL    MCH 24.9 (L) 26.0 - 34.0 PG    MCHC 29.9 (L) 30.0 - 36.5 g/dL    RDW 17.7 (H) 11.5 - 14.5 %    PLATELET 506 785 - 313 K/uL    MPV 10.7 8.9 - 12.9 FL    NRBC 0.4 (H) 0.0  WBC    ABSOLUTE NRBC 0.02 (H) 0.00 - 0.01 K/uL    NEUTROPHILS 56 32 - 75 %    LYMPHOCYTES 32 12 - 49 %    MONOCYTES 10 5 - 13 %    EOSINOPHILS 1 0 - 7 %    BASOPHILS 1 0 - 1 %    IMMATURE GRANULOCYTES 0 0 - 0.5 %    ABS. NEUTROPHILS 3.1 1.8 - 8.0 K/UL    ABS. LYMPHOCYTES 1.8 0.8 - 3.5 K/UL    ABS. MONOCYTES 0.5 0.0 - 1.0 K/UL    ABS. EOSINOPHILS 0.1 0.0 - 0.4 K/UL    ABS. BASOPHILS 0.0 0.0 - 0.1 K/UL    ABS. IMM.  GRANS. 0.0 0.00 - 0.04 K/UL    DF AUTOMATED     METABOLIC PANEL, COMPREHENSIVE    Collection Time: 06/03/21  3:40 AM   Result Value Ref Range    Sodium 135 (L) 136 - 145 mmol/L    Potassium 4.7 3.5 - 5.1 mmol/L    Chloride 102 97 - 108 mmol/L    CO2 22 21 - 32 mmol/L    Anion gap 11 5 - 15 mmol/L    Glucose 125 (H) 65 - 100 mg/dL    BUN 44 (H) 6 - 20 mg/dL    Creatinine 5.55 (H) 0.70 - 1.30 mg/dL    BUN/Creatinine ratio 8 (L) 12 - 20      GFR est AA 14 (L) >60 ml/min/1.73m2    GFR est non-AA 12 (L) >60 ml/min/1.73m2    Calcium 7.8 (L) 8.5 - 10.1 mg/dL    Bilirubin, total 0.5 0.2 - 1.0 mg/dL    AST (SGOT) 188 (H) 15 - 37 U/L    ALT (SGPT) 98 (H) 12 - 78 U/L    Alk.  phosphatase 54 45 - 117 U/L    Protein, total 6.7 6.4 - 8.2 g/dL    Albumin 2.7 (L) 3.5 - 5.0 g/dL    Globulin 4.0 2.0 - 4.0 g/dL    A-G Ratio 0.7 (L) 1.1 - 2.2     MAGNESIUM    Collection Time: 06/03/21  3:40 AM   Result Value Ref Range    Magnesium 1.9 1.6 - 2.4 mg/dL   FIBRINOGEN    Collection Time: 06/03/21  3:40 AM   Result Value Ref Range    Fibrinogen 791 (H) 220 - 535 mg/dL   C REACTIVE PROTEIN, QT    Collection Time: 06/03/21  3:40 AM   Result Value Ref Range    C-Reactive protein 29.10 (H) 0.00 - 0.60 mg/dL   LD    Collection Time: 06/03/21  3:40 AM   Result Value Ref Range     (H) 85 - 241 U/L   TROPONIN I    Collection Time: 06/03/21  3:40 AM   Result Value Ref Range    Troponin-I, Qt. <0.05 <0.05 ng/mL   LACTIC ACID    Collection Time: 06/03/21  3:40 AM   Result Value Ref Range    Lactic acid 1.2 0.4 - 2.0 mmol/L   TSH 3RD GENERATION    Collection Time: 06/03/21  3:40 AM   Result Value Ref Range    TSH 0.77 0.36 - 3.74 uIU/mL   GLUCOSE, POC    Collection Time: 06/03/21  7:40 AM   Result Value Ref Range    Glucose (POC) 200 (H) 65 - 117 mg/dL    Performed by Inessa Hylton    DRUG SCREEN, URINE    Collection Time: 06/03/21  9:00 AM   Result Value Ref Range    AMPHETAMINES Negative Negative      BARBITURATES Negative Negative      BENZODIAZEPINES Negative Negative      COCAINE Negative Negative      METHADONE Negative Negative      OPIATES Negative Negative      PCP(PHENCYCLIDINE) Negative Negative      THC (TH-CANNABINOL) Negative Negative      Drug screen comment        This test is a screen for drugs of abuse in a medical setting only (i.e., they are unconfirmed results and as such must not be used for non-medical purposes, e.g.,employment testing, legal testing). Due to its inherent nature, false positive (FP) and false negative (FN) results may be obtained. Therefore, if necessary for medical care, recommend confirmation of positive findings by GC/MS. CREATININE, UR, RANDOM    Collection Time: 06/03/21  9:00 AM   Result Value Ref Range    Creatinine, urine 249.00 mg/dL   SODIUM, UR, RANDOM    Collection Time: 06/03/21  9:00 AM   Result Value Ref Range    Sodium,urine random 20 mmol/L   POTASSIUM, UR, RANDOM    Collection Time: 06/03/21  9:00 AM   Result Value Ref Range    Potassium urine, random 22 mmol/L   URINALYSIS W/MICROSCOPIC    Collection Time: 06/03/21  9:30 AM   Result Value Ref Range    Color Yellow/Straw      Appearance Turbid (A) Clear      Specific gravity 1.013 1.003 - 1.030      pH (UA) 5.0 5.0 - 8.0      Protein 100 (A) Negative mg/dL    Glucose Negative Negative mg/dL    Ketone Negative Negative mg/dL    Bilirubin Negative Negative      Blood Large (A) Negative      Urobilinogen 0.1 0.1 - 1.0 EU/dL    Nitrites Negative Negative      Leukocyte Esterase Negative Negative      WBC 10-20 0 - 4 /hpf    RBC 5-10 0 - 5 /hpf    Bacteria Negative Negative /hpf    Mucus Trace /lpf           Assessment/     COVID-19 with pneumonitis  Acute kidney injury. No prior history of kidney damage  Acute rhabdomyolysis  Benign essential hypertension  Hyperglycemia rule out diabetes  Marked morbid obesity    Plan:  Continue supportive care including oral Decadron and antibiotics  If patient becomes hypoxic, we will consider remdesivir  Monitor renal functions daily. Discontinue bicarbonate drip and begin normal saline at 75 cc an hour      Care Plan discussed with: Patient/Family    Total time spent with patient: 30 minutes.     Neal Britt MD

## 2021-06-03 NOTE — CONSULTS
Consult Date: 6/3/2021    IP CONSULT TO NEPHROLOGY  Consult performed by: Yue Steward MD  Consult ordered by: Rigoberto Bowens MD  Reason for consult: THOR  Assessment/Recommendations:     # Oliguric THOR stage III -2/2 NSAID use in the setting of COVID-19 sx, with contribution from volume depletion from profound diarrhea and reduced PO intake    # Rhabdomyolysis -CPK >6000    # Volume depletion    # Hypertension    # COVID-19 infection      Plan:   Infuse 1L bolus further, Increase IV fluids infusion to 200 cc/hr   Trend CPK   Hold home antihypertensives  Renal following          Subjective   40yr old male with PMH of lumbar osteoarthritis, well controlled HTN for 6 years, and no hx of renal disease, presents with 1.5 weeks of shortness of breath, feeling ill, diarrhea, dry mouth, poor oral intake and fever/chills, and subsequently diagnosed with COVID-19 infection. Reports no swelling, orthopnea, CP, sleep disturbance, while having reduced activity, aforementioned sx and dec. And dark urination. Had been taking sig. Quantities of Sudafed (half a pack as per him over 3 days) and daily tylenol. Since coming to hosp, reports feeling somewhat better. Reports some urination (~300 cc  Over last 24 hours). Labs reveal marked elevation of Creatinine (>5) and CPK (>6K). Received 2L NS bolus and then at 100 cc/hr. At encounter, not on fluids  BP has remained stable  Past Medical History:   Diagnosis Date    Arthritis     Hypertension       History reviewed. No pertinent surgical history. History reviewed. No pertinent family history.    Social History     Tobacco Use    Smoking status: Never Smoker    Smokeless tobacco: Never Used   Substance Use Topics    Alcohol use: Yes       Current Facility-Administered Medications   Medication Dose Route Frequency Provider Last Rate Last Admin    insulin lispro (HUMALOG) injection   SubCUTAneous AC&HS Rigoberto Bowens MD   2 Units at 06/03/21 1130    glucose chewable tablet 16 g  4 Tablet Oral PRN Lacho Lan MD        dextrose (D50W) injection syrg 12.5-25 g  25-50 mL IntraVENous PRN Lacho Lan MD        glucagon (GLUCAGEN) injection 1 mg  1 mg IntraMUSCular PRN Lacho Lan MD        apixaban (ELIQUIS) tablet 5 mg  5 mg Oral Q12H Laurie Mary MD   5 mg at 06/03/21 0841    0.9% sodium chloride infusion  100 mL/hr IntraVENous CONTINUOUS Laurie Mary  mL/hr at 06/03/21 0846 100 mL/hr at 06/03/21 0846    azithromycin (ZITHROMAX) 500 mg in 0.9% sodium chloride 250 mL (VIAL-MATE)  500 mg IntraVENous Q24H Laurie Mary  mL/hr at 06/03/21 1150 500 mg at 06/03/21 1150    zinc gluconate tablet 50 mg  1 Tablet Oral DAILY Laurie Mary MD   50 mg at 06/03/21 1150    cholecalciferol (VITAMIN D3) (1000 Units /25 mcg) tablet 2,000 Units  2,000 Units Oral DAILY Laurie Mary MD   2,000 Units at 06/03/21 1151    ascorbic acid (vitamin C) (VITAMIN C) tablet 500 mg  500 mg Oral BID Laurie Mary MD   500 mg at 06/03/21 1151    sodium chloride (NS) flush 5-10 mL  5-10 mL IntraVENous PRN Ana Luisa MALIK DO   5 mL at 06/02/21 1714    sodium chloride (NS) flush 5-40 mL  5-40 mL IntraVENous Q8H Lacho Lan MD   10 mL at 06/03/21 0601    sodium chloride (NS) flush 5-40 mL  5-40 mL IntraVENous PRN Lacho Lan MD        ondansetron (ZOFRAN ODT) tablet 4 mg  4 mg Oral Q6H PRN Lacho Lan MD        Or    ondansetron (ZOFRAN) injection 4 mg  4 mg IntraVENous Q6H PRN Lacho Lan MD        acetaminophen (TYLENOL) tablet 650 mg  650 mg Oral Q6H PRN Lacho Lan MD        Or    acetaminophen (TYLENOL) suppository 650 mg  650 mg Rectal Q6H PRN Lacho Lan MD        dexAMETHasone (DECADRON) tablet 6 mg  6 mg Oral DAILY Lacho Lan MD   6 mg at 06/03/21 9018        Review of Systems   Constitutional: Positive for activity change, appetite change, chills and fatigue. HENT: Positive for congestion. Respiratory: Positive for shortness of breath. Cardiovascular: Negative for chest pain. Gastrointestinal: Positive for diarrhea. Negative for blood in stool, nausea and vomiting. Genitourinary: Negative for difficulty urinating and hematuria. Musculoskeletal: Positive for back pain. Negative for gait problem and joint swelling. Skin: Negative. Neurological: Negative. Hematological: Negative. Objective     Vital signs for last 24 hours:  Visit Vitals  BP (!) 140/89 (BP 1 Location: Left upper arm, BP Patient Position: At rest;Standing)   Pulse 74   Temp 97.5 °F (36.4 °C)   Resp 18   Ht 6' 3\" (1.905 m)   Wt 140.6 kg (310 lb)   SpO2 91%   BMI 38.75 kg/m²       Intake/Output this shift:  Current Shift: No intake/output data recorded. Last 3 Shifts: No intake/output data recorded. Data Review:   Recent Results (from the past 24 hour(s))   METABOLIC PANEL, COMPREHENSIVE    Collection Time: 06/02/21  4:30 PM   Result Value Ref Range    Sodium 134 (L) 136 - 145 mmol/L    Potassium 4.4 3.5 - 5.1 mmol/L    Chloride 99 97 - 108 mmol/L    CO2 26 21 - 32 mmol/L    Anion gap 9 5 - 15 mmol/L    Glucose 88 65 - 100 mg/dL    BUN 32 (H) 6 - 20 mg/dL    Creatinine 5.00 (H) 0.70 - 1.30 mg/dL    BUN/Creatinine ratio 6 (L) 12 - 20      GFR est AA 16 (L) >60 ml/min/1.73m2    GFR est non-AA 13 (L) >60 ml/min/1.73m2    Calcium 8.1 (L) 8.5 - 10.1 mg/dL    Bilirubin, total 0.7 0.2 - 1.0 mg/dL    AST (SGOT) 236 (H) 15 - 37 U/L    ALT (SGPT) 98 (H) 12 - 78 U/L    Alk. phosphatase 52 45 - 117 U/L    Protein, total 7.9 6.4 - 8.2 g/dL    Albumin 3.0 (L) 3.5 - 5.0 g/dL    Globulin 4.9 (H) 2.0 - 4.0 g/dL    A-G Ratio 0.6 (L) 1.1 - 2.2     CBC WITH AUTOMATED DIFF    Collection Time: 06/02/21  4:30 PM   Result Value Ref Range    WBC 10.5 4.1 - 11.1 K/uL    RBC 5.40 4. 10 - 5.70 M/uL    HGB 15.5 12.1 - 17.0 g/dL    HCT 44.4 36.6 - 50.3 %    MCV 82.2 80.0 - 99.0 FL    MCH 28.7 26.0 - 34.0 PG    MCHC 34.9 30.0 - 36.5 g/dL    RDW 14.5 11.5 - 14.5 %    PLATELET 503 818 - 539 K/uL    NRBC 0.0 0.0  WBC    ABSOLUTE NRBC 0.00 0.00 - 0.01 K/uL    NEUTROPHILS 83 (H) 32 - 75 %    LYMPHOCYTES 11 (L) 12 - 49 %    MONOCYTES 5 5 - 13 %    EOSINOPHILS 0 0 - 7 %    BASOPHILS 0 0 - 1 %    IMMATURE GRANULOCYTES 1 (H) 0 - 0.5 %    ABS. NEUTROPHILS 8.7 (H) 1.8 - 8.0 K/UL    ABS. LYMPHOCYTES 1.2 0.8 - 3.5 K/UL    ABS. MONOCYTES 0.6 0.0 - 1.0 K/UL    ABS. EOSINOPHILS 0.0 0.0 - 0.4 K/UL    ABS. BASOPHILS 0.0 0.0 - 0.1 K/UL    ABS. IMM.  GRANS. 0.1 (H) 0.00 - 0.04 K/UL    DF AUTOMATED     TROPONIN I    Collection Time: 06/02/21  4:30 PM   Result Value Ref Range    Troponin-I, Qt. <0.05 <0.05 ng/mL   LD    Collection Time: 06/02/21  4:30 PM   Result Value Ref Range     (H) 85 - 241 U/L   PROTHROMBIN TIME + INR    Collection Time: 06/02/21  4:30 PM   Result Value Ref Range    Prothrombin time 14.2 11.9 - 14.7 sec    INR 1.1 0.9 - 1.1     PTT    Collection Time: 06/02/21  4:30 PM   Result Value Ref Range    aPTT 35.0 (H) 21.2 - 34.1 sec    aPTT, therapeutic range   82 - 109 sec   BNP    Collection Time: 06/02/21  4:45 PM   Result Value Ref Range    NT pro-BNP 50 <125 pg/mL   D DIMER    Collection Time: 06/02/21  4:45 PM   Result Value Ref Range    D DIMER 1.04 (H) <0.50 ug/ml(FEU)   FERRITIN    Collection Time: 06/02/21  4:45 PM   Result Value Ref Range    Ferritin 1,523 (H) 26 - 388 ng/mL   PROCALCITONIN    Collection Time: 06/02/21  4:45 PM   Result Value Ref Range    Procalcitonin 1.63 (H) 0 ng/mL   LACTIC ACID    Collection Time: 06/02/21  4:45 PM   Result Value Ref Range    Lactic acid 1.2 0.4 - 2.0 mmol/L   RENAL FUNCTION PANEL    Collection Time: 06/02/21 10:55 PM   Result Value Ref Range    Sodium 136 136 - 145 mmol/L    Potassium 4.4 3.5 - 5.1 mmol/L    Chloride 102 97 - 108 mmol/L    CO2 25 21 - 32 mmol/L    Anion gap 9 5 - 15 mmol/L    Glucose 115 (H) 65 - 100 mg/dL    BUN 37 (H) 6 - 20 mg/dL    Creatinine 5.55 (H) 0.70 - 1.30 mg/dL    BUN/Creatinine ratio 7 (L) 12 - 20      GFR est AA 14 (L) >60 ml/min/1.73m2    GFR est non-AA 12 (L) >60 ml/min/1.73m2    Calcium 8.2 (L) 8.5 - 10.1 mg/dL    Phosphorus 4.4 2.6 - 4.7 mg/dL    Albumin 2.6 (L) 3.5 - 5.0 g/dL   CK    Collection Time: 06/02/21 10:55 PM   Result Value Ref Range    CK 6,267 (H) 39 - 308 U/L   SARS-COV-2    Collection Time: 06/03/21  1:30 AM   Result Value Ref Range    SARS-CoV-2 Please find results under separate order     COVID-19 RAPID TEST    Collection Time: 06/03/21  1:30 AM   Result Value Ref Range    Specimen source Nasopharyngeal      COVID-19 rapid test Not Detected Not Detected     CBC WITH AUTOMATED DIFF    Collection Time: 06/03/21  3:40 AM   Result Value Ref Range    WBC 5.5 4.1 - 11.1 K/uL    RBC 4.46 4.10 - 5.70 M/uL    HGB 11.1 (L) 12.1 - 17.0 g/dL    HCT 37.1 36.6 - 50.3 %    MCV 83.2 80.0 - 99.0 FL    MCH 24.9 (L) 26.0 - 34.0 PG    MCHC 29.9 (L) 30.0 - 36.5 g/dL    RDW 17.7 (H) 11.5 - 14.5 %    PLATELET 023 031 - 231 K/uL    MPV 10.7 8.9 - 12.9 FL    NRBC 0.4 (H) 0.0  WBC    ABSOLUTE NRBC 0.02 (H) 0.00 - 0.01 K/uL    NEUTROPHILS 56 32 - 75 %    LYMPHOCYTES 32 12 - 49 %    MONOCYTES 10 5 - 13 %    EOSINOPHILS 1 0 - 7 %    BASOPHILS 1 0 - 1 %    IMMATURE GRANULOCYTES 0 0 - 0.5 %    ABS. NEUTROPHILS 3.1 1.8 - 8.0 K/UL    ABS. LYMPHOCYTES 1.8 0.8 - 3.5 K/UL    ABS. MONOCYTES 0.5 0.0 - 1.0 K/UL    ABS. EOSINOPHILS 0.1 0.0 - 0.4 K/UL    ABS. BASOPHILS 0.0 0.0 - 0.1 K/UL    ABS. IMM.  GRANS. 0.0 0.00 - 0.04 K/UL    DF AUTOMATED     METABOLIC PANEL, COMPREHENSIVE    Collection Time: 06/03/21  3:40 AM   Result Value Ref Range    Sodium 135 (L) 136 - 145 mmol/L    Potassium 4.7 3.5 - 5.1 mmol/L    Chloride 102 97 - 108 mmol/L    CO2 22 21 - 32 mmol/L    Anion gap 11 5 - 15 mmol/L    Glucose 125 (H) 65 - 100 mg/dL    BUN 44 (H) 6 - 20 mg/dL    Creatinine 5.55 (H) 0.70 - 1.30 mg/dL    BUN/Creatinine ratio 8 (L) 12 - 20      GFR est AA 14 (L) >60 ml/min/1.73m2    GFR est non-AA 12 (L) >60 ml/min/1.73m2    Calcium 7.8 (L) 8.5 - 10.1 mg/dL    Bilirubin, total 0.5 0.2 - 1.0 mg/dL    AST (SGOT) 188 (H) 15 - 37 U/L    ALT (SGPT) 98 (H) 12 - 78 U/L    Alk.  phosphatase 54 45 - 117 U/L    Protein, total 6.7 6.4 - 8.2 g/dL    Albumin 2.7 (L) 3.5 - 5.0 g/dL    Globulin 4.0 2.0 - 4.0 g/dL    A-G Ratio 0.7 (L) 1.1 - 2.2     MAGNESIUM    Collection Time: 06/03/21  3:40 AM   Result Value Ref Range    Magnesium 1.9 1.6 - 2.4 mg/dL   FIBRINOGEN    Collection Time: 06/03/21  3:40 AM   Result Value Ref Range    Fibrinogen 791 (H) 220 - 535 mg/dL   C REACTIVE PROTEIN, QT    Collection Time: 06/03/21  3:40 AM   Result Value Ref Range    C-Reactive protein 29.10 (H) 0.00 - 0.60 mg/dL   LD    Collection Time: 06/03/21  3:40 AM   Result Value Ref Range     (H) 85 - 241 U/L   FERRITIN    Collection Time: 06/03/21  3:40 AM   Result Value Ref Range    Ferritin 26 26 - 388 ng/mL   TROPONIN I    Collection Time: 06/03/21  3:40 AM   Result Value Ref Range    Troponin-I, Qt. <0.05 <0.05 ng/mL   LACTIC ACID    Collection Time: 06/03/21  3:40 AM   Result Value Ref Range    Lactic acid 1.2 0.4 - 2.0 mmol/L   TSH 3RD GENERATION    Collection Time: 06/03/21  3:40 AM   Result Value Ref Range    TSH 0.77 0.36 - 3.74 uIU/mL   GLUCOSE, POC    Collection Time: 06/03/21  7:40 AM   Result Value Ref Range    Glucose (POC) 200 (H) 65 - 117 mg/dL    Performed by Santi Roberts    DRUG SCREEN, URINE    Collection Time: 06/03/21  9:00 AM   Result Value Ref Range    AMPHETAMINES Negative Negative      BARBITURATES Negative Negative      BENZODIAZEPINES Negative Negative      COCAINE Negative Negative      METHADONE Negative Negative      OPIATES Negative Negative      PCP(PHENCYCLIDINE) Negative Negative      THC (TH-CANNABINOL) Negative Negative      Drug screen comment        This test is a screen for drugs of abuse in a medical setting only (i.e., they are unconfirmed results and as such must not be used for non-medical purposes, e.g.,employment testing, legal testing). Due to its inherent nature, false positive (FP) and false negative (FN) results may be obtained. Therefore, if necessary for medical care, recommend confirmation of positive findings by GC/MS. CREATININE, UR, RANDOM    Collection Time: 06/03/21  9:00 AM   Result Value Ref Range    Creatinine, urine 249.00 mg/dL   SODIUM, UR, RANDOM    Collection Time: 06/03/21  9:00 AM   Result Value Ref Range    Sodium,urine random 20 mmol/L   POTASSIUM, UR, RANDOM    Collection Time: 06/03/21  9:00 AM   Result Value Ref Range    Potassium urine, random 22 mmol/L   D DIMER    Collection Time: 06/03/21  9:20 AM   Result Value Ref Range    D DIMER 0.97 (H) <0.50 ug/ml(FEU)   URINALYSIS W/MICROSCOPIC    Collection Time: 06/03/21  9:30 AM   Result Value Ref Range    Color Yellow/Straw      Appearance Turbid (A) Clear      Specific gravity 1.013 1.003 - 1.030      pH (UA) 5.0 5.0 - 8.0      Protein 100 (A) Negative mg/dL    Glucose Negative Negative mg/dL    Ketone Negative Negative mg/dL    Bilirubin Negative Negative      Blood Large (A) Negative      Urobilinogen 0.1 0.1 - 1.0 EU/dL    Nitrites Negative Negative      Leukocyte Esterase Negative Negative      WBC 10-20 0 - 4 /hpf    RBC 5-10 0 - 5 /hpf    Bacteria Negative Negative /hpf    Mucus Trace /lpf   GLUCOSE, POC    Collection Time: 06/03/21 11:35 AM   Result Value Ref Range    Glucose (POC) 159 (H) 65 - 117 mg/dL    Performed by [facility]        Physical Exam  Constitutional:       Appearance: Normal appearance. HENT:      Nose: Nose normal.   Cardiovascular:      Rate and Rhythm: Normal rate and regular rhythm. Pulses: Normal pulses. Pulmonary:      Effort: Pulmonary effort is normal.      Comments: Good aeration bilaterally  Abdominal:      General: Abdomen is flat. Palpations: Abdomen is soft.    Musculoskeletal:         General: Normal range of motion. Skin:     General: Skin is warm and dry. Findings: No lesion or rash. Neurological:      Mental Status: He is alert.

## 2021-06-03 NOTE — H&P
History and Physical              Subjective :   Chief Complaint : Shortness of breath, tingling and numbness of hands. Source of information : Patient is not a good historian, ED provider. History of present illness:   72-year-old morbid obese male with a history of hypertension presents to the emergency room complaining of shortness of breath with difficulty breathing. Denies any sputum production. States he started having symptoms 1 week ago, had tested 2 days ago was told positive for COVID-19. . States that he has decreased taste and smell. He had fever and chills. The shortness of breath is getting worse in intensity, and today he has trouble to get breath. He also has some tightness in the chest in the retrosternal area worse with activity. He cannot recall how he got it and who in contact with. He is a , he is in [place] for the last 2 days. Past Medical History:   Diagnosis Date    Arthritis     Hypertension      Past surgical history: None    Family history: Denies any significant medical problems in the family     Social History     Tobacco Use    Smoking status: Never Smoker    Smokeless tobacco: Never Used   Substance Use Topics    Alcohol use: Yes         Home medications: On hypertensive medication but does not. Allergies: Denies any significant medication allergies. Review of Systems:  Constitutional: Appetite is poor, denies weight loss. No fever, no chills, no night sweats. Eye: No recent visual disturbances, no discharge, no double vision. Ear/nose/mouth/throat : No hearing disturbance, no ear pain, no nasal congestion, no sore throat, no trouble swallowing. Respiratory : No trouble breathing, no cough,   ++shortness of breath, no hemoptysis, no wheezing. Cardiovascular : No chest pain, no palpitation,, no orthopnea,  no peripheral edema. Gastrointestinal : No nausea, no vomiting, no diarrhea,  No abdominal pain.   Genitourinary : No dysuria, no hematuria, no increased frequency, No incontinence. Lymphatics : No swollen glands -Neck, axillary, inguinal.  Endocrine : No excessive thirst, No polyuria No cold intolerance, No heat intolerance. Immunologic : No hives, urticaria, No seasonal allergies. Musculoskeletal : No joint swelling, No pain, No effusion,  No back pain, No neck pain. Integumentary : No rash, No pruritus, No ecchymosis. Hematology : No petechiae, No easy bruising,  No tendency to bleed easy. Neurology : Denies change in mental status, No abnormal balance, No headache, No confusion, No numbness or tingling. Psychiatric : No mood swings, No anxiety, No depression. Vitals:     Patient Vitals for the past 12 hrs:   Temp Pulse Resp BP SpO2   06/02/21 2234 98.9 °F (37.2 °C) 78 (!) 40 (!) 140/73 96 %   06/02/21 1755  94 (!) 40 (!) 144/79 95 %   06/02/21 1616     92 %   06/02/21 1609 (!) 100.9 °F (38.3 °C) 100 (!) 92 137/84 92 %       Physical Exam:   General : Morbidly obese, looks tired, no respiratory distress noted. HEENT : PERRLA, dry oral mucosa, atraumatic normocephalic, Normal ear and nose. Neck : Supple, no JVD, no masses noted, no carotid bruit. Lungs : Breath sounds with moderate air entry bilaterally, no wheezes or rales, no accessory muscle use. CVS : Rhythm rate regular, S1+, S2+, no murmur or gallop. Tachycardia noted. Abdomen : Soft, nontender, obese. Bowel sounds active. Extremities : Trace edema noted,  pedal pulses palpable. Musculoskeletal : Fair range of motion, no joint swelling or effusion, muscle tone and power appears normal.   Skin : Moist, warm. No pathological rash. Lymphatic : No cervical lymphadenopathy. Neurological : Awake, alert, oriented to time place person. No neurological deficits. Psychiatric : Mood and affect appears appropriate to the situation.        Data Review:   Recent Results (from the past 24 hour(s))   METABOLIC PANEL, COMPREHENSIVE    Collection Time: 06/02/21  4:30 PM   Result Value Ref Range    Sodium 134 (L) 136 - 145 mmol/L    Potassium 4.4 3.5 - 5.1 mmol/L    Chloride 99 97 - 108 mmol/L    CO2 26 21 - 32 mmol/L    Anion gap 9 5 - 15 mmol/L    Glucose 88 65 - 100 mg/dL    BUN 32 (H) 6 - 20 mg/dL    Creatinine 5.00 (H) 0.70 - 1.30 mg/dL    BUN/Creatinine ratio 6 (L) 12 - 20      GFR est AA 16 (L) >60 ml/min/1.73m2    GFR est non-AA 13 (L) >60 ml/min/1.73m2    Calcium 8.1 (L) 8.5 - 10.1 mg/dL    Bilirubin, total 0.7 0.2 - 1.0 mg/dL    AST (SGOT) 236 (H) 15 - 37 U/L    ALT (SGPT) 98 (H) 12 - 78 U/L    Alk. phosphatase 52 45 - 117 U/L    Protein, total 7.9 6.4 - 8.2 g/dL    Albumin 3.0 (L) 3.5 - 5.0 g/dL    Globulin 4.9 (H) 2.0 - 4.0 g/dL    A-G Ratio 0.6 (L) 1.1 - 2.2     CBC WITH AUTOMATED DIFF    Collection Time: 06/02/21  4:30 PM   Result Value Ref Range    WBC 10.5 4.1 - 11.1 K/uL    RBC 5.40 4. 10 - 5.70 M/uL    HGB 15.5 12.1 - 17.0 g/dL    HCT 44.4 36.6 - 50.3 %    MCV 82.2 80.0 - 99.0 FL    MCH 28.7 26.0 - 34.0 PG    MCHC 34.9 30.0 - 36.5 g/dL    RDW 14.5 11.5 - 14.5 %    PLATELET 046 957 - 002 K/uL    NRBC 0.0 0.0  WBC    ABSOLUTE NRBC 0.00 0.00 - 0.01 K/uL    NEUTROPHILS 83 (H) 32 - 75 %    LYMPHOCYTES 11 (L) 12 - 49 %    MONOCYTES 5 5 - 13 %    EOSINOPHILS 0 0 - 7 %    BASOPHILS 0 0 - 1 %    IMMATURE GRANULOCYTES 1 (H) 0 - 0.5 %    ABS. NEUTROPHILS 8.7 (H) 1.8 - 8.0 K/UL    ABS. LYMPHOCYTES 1.2 0.8 - 3.5 K/UL    ABS. MONOCYTES 0.6 0.0 - 1.0 K/UL    ABS. EOSINOPHILS 0.0 0.0 - 0.4 K/UL    ABS. BASOPHILS 0.0 0.0 - 0.1 K/UL    ABS. IMM.  GRANS. 0.1 (H) 0.00 - 0.04 K/UL    DF AUTOMATED     TROPONIN I    Collection Time: 06/02/21  4:30 PM   Result Value Ref Range    Troponin-I, Qt. <0.05 <0.05 ng/mL   LD    Collection Time: 06/02/21  4:30 PM   Result Value Ref Range     (H) 85 - 241 U/L   PROTHROMBIN TIME + INR    Collection Time: 06/02/21  4:30 PM   Result Value Ref Range    Prothrombin time 14.2 11.9 - 14.7 sec    INR 1.1 0.9 - 1.1     PTT    Collection Time: 06/02/21  4:30 PM Result Value Ref Range    aPTT 35.0 (H) 21.2 - 34.1 sec    aPTT, therapeutic range   82 - 109 sec   BNP    Collection Time: 06/02/21  4:45 PM   Result Value Ref Range    NT pro-BNP 50 <125 pg/mL   D DIMER    Collection Time: 06/02/21  4:45 PM   Result Value Ref Range    D DIMER 1.04 (H) <0.50 ug/ml(FEU)   PROCALCITONIN    Collection Time: 06/02/21  4:45 PM   Result Value Ref Range    Procalcitonin 1.63 (H) 0 ng/mL   LACTIC ACID    Collection Time: 06/02/21  4:45 PM   Result Value Ref Range    Lactic acid 1.2 0.4 - 2.0 mmol/L       Radiologic Studies :     CXR Results  (Last 48 hours)               06/02/21 1644  XR CHEST PORT Final result    Narrative:  Chest single view. Hazy reticular markings predominant central lungs. Consider   infectious/inflammatory process. Cardiac and mediastinal structures magnified on   this AP view. No pneumothorax or sizable pleural effusion. Assessment and Plan :     Pneumonia due to COVID-19 infection, admitted for further management    Acute kidney injury: Etiology unclear, but worsening of creatinine. Patient do not have any history. Rhabdomyolysis: Elevated CPK, started on IV fluids with bicarb. Consultation placed for nephrology for further help with management. Benign essential hypertension: Did not start on any medications, we will monitor closely and order medication as condition dictates    Elevated blood sugars with no history of diabetes: We will keep him on Accu-Cheks with a sliding scale insulin. Admitted to medical telemetry, full CODE STATUS, unable to verify the home medications. CC : None  Signed By: Wiley Price MD     June 2, 2021      This dictation was done by dragon, computer voice recognition software. Often unanticipated grammatical, syntax, Adams phones and other interpretive errors are inadvertently transcribed. Please excuse errors that have escaped final proofreading.

## 2021-06-03 NOTE — PROGRESS NOTES
Pt to the floor via stretcher accompanied by ED staff. Pt alert and oriented, denies pain. Pt skin assessed by myself and Vickie Alaniz RN. Pt skin intact, no wounds or drainage areas noted.

## 2021-06-04 LAB
ARTERIAL PATENCY WRIST A: ABNORMAL
ATRIAL RATE: 96 BPM
BASE DEFICIT BLDA-SCNC: 1.9 MMOL/L (ref 0–2)
BDY SITE: ABNORMAL
CALCULATED P AXIS, ECG09: 41 DEGREES
CALCULATED R AXIS, ECG10: 95 DEGREES
CK SERPL-CCNC: 1654 U/L (ref 39–308)
CK SERPL-CCNC: 1941 U/L (ref 39–308)
DIAGNOSIS, 93000: NORMAL
FIO2 ON VENT: 21 %
GLUCOSE BLD STRIP.AUTO-MCNC: 130 MG/DL (ref 65–117)
GLUCOSE BLD STRIP.AUTO-MCNC: 134 MG/DL (ref 65–117)
GLUCOSE BLD STRIP.AUTO-MCNC: 143 MG/DL (ref 65–117)
GLUCOSE BLD STRIP.AUTO-MCNC: 157 MG/DL (ref 65–117)
HCO3 BLDA-SCNC: 23 MMOL/L (ref 22–26)
P-R INTERVAL, ECG05: 158 MS
PCO2 BLDA: 33 MMHG (ref 35–45)
PERFORMED BY, TECHID: ABNORMAL
PH BLDA: 7.42 [PH] (ref 7.35–7.45)
PO2 BLDA: 64 MMHG (ref 75–100)
Q-T INTERVAL, ECG07: 344 MS
QRS DURATION, ECG06: 90 MS
QTC CALCULATION (BEZET), ECG08: 434 MS
SAO2 % BLD: 92 %
SAO2% DEVICE SAO2% SENSOR NAME: ABNORMAL
SERVICE CMNT-IMP: ABNORMAL
VENTRICULAR RATE, ECG03: 96 BPM

## 2021-06-04 PROCEDURE — 74011250636 HC RX REV CODE- 250/636: Performed by: INTERNAL MEDICINE

## 2021-06-04 PROCEDURE — 74011250637 HC RX REV CODE- 250/637: Performed by: INTERNAL MEDICINE

## 2021-06-04 PROCEDURE — 74011636637 HC RX REV CODE- 636/637: Performed by: HOSPITALIST

## 2021-06-04 PROCEDURE — 36415 COLL VENOUS BLD VENIPUNCTURE: CPT

## 2021-06-04 PROCEDURE — 65270000029 HC RM PRIVATE

## 2021-06-04 PROCEDURE — 82803 BLOOD GASES ANY COMBINATION: CPT

## 2021-06-04 PROCEDURE — 82962 GLUCOSE BLOOD TEST: CPT

## 2021-06-04 PROCEDURE — 82550 ASSAY OF CK (CPK): CPT

## 2021-06-04 RX ORDER — ERGOCALCIFEROL 1.25 MG/1
50000 CAPSULE ORAL
Status: DISCONTINUED | OUTPATIENT
Start: 2021-06-04 | End: 2021-06-06 | Stop reason: HOSPADM

## 2021-06-04 RX ORDER — HYDRALAZINE HYDROCHLORIDE 20 MG/ML
5 INJECTION INTRAMUSCULAR; INTRAVENOUS
Status: DISCONTINUED | OUTPATIENT
Start: 2021-06-04 | End: 2021-06-06 | Stop reason: HOSPADM

## 2021-06-04 RX ADMIN — Medication 2000 UNITS: at 09:04

## 2021-06-04 RX ADMIN — Medication 50 MG: at 09:04

## 2021-06-04 RX ADMIN — Medication 10 ML: at 20:35

## 2021-06-04 RX ADMIN — Medication 10 ML: at 05:24

## 2021-06-04 RX ADMIN — INSULIN LISPRO 3 UNITS: 100 INJECTION, SOLUTION INTRAVENOUS; SUBCUTANEOUS at 12:49

## 2021-06-04 RX ADMIN — Medication 10 ML: at 14:07

## 2021-06-04 RX ADMIN — INSULIN LISPRO 2 UNITS: 100 INJECTION, SOLUTION INTRAVENOUS; SUBCUTANEOUS at 16:30

## 2021-06-04 RX ADMIN — OXYCODONE HYDROCHLORIDE AND ACETAMINOPHEN 500 MG: 500 TABLET ORAL at 20:34

## 2021-06-04 RX ADMIN — Medication 10 ML: at 00:23

## 2021-06-04 RX ADMIN — APIXABAN 5 MG: 2.5 TABLET, FILM COATED ORAL at 20:34

## 2021-06-04 RX ADMIN — APIXABAN 5 MG: 2.5 TABLET, FILM COATED ORAL at 09:05

## 2021-06-04 RX ADMIN — ERGOCALCIFEROL 50000 UNITS: 1.25 CAPSULE ORAL at 17:05

## 2021-06-04 RX ADMIN — AZITHROMYCIN 500 MG: 500 INJECTION, POWDER, LYOPHILIZED, FOR SOLUTION INTRAVENOUS at 12:40

## 2021-06-04 RX ADMIN — OXYCODONE HYDROCHLORIDE AND ACETAMINOPHEN 500 MG: 500 TABLET ORAL at 09:05

## 2021-06-04 NOTE — PROGRESS NOTES
Problem: Airway Clearance - Ineffective  Goal: *Patent airway  Outcome: Progressing Towards Goal  Goal: *Absence of airway secretions  Outcome: Progressing Towards Goal  Goal: *Able to cough effectively  Outcome: Progressing Towards Goal  Goal: *PALLIATIVE CARE:  Alleviation of secretions, cough and/or nasal congestion  Outcome: Progressing Towards Goal     Problem: Patient Education: Go to Patient Education Activity  Goal: Patient/Family Education  Outcome: Progressing Towards Goal     Problem: Pressure Injury - Risk of  Goal: *Prevention of pressure injury  Description: Document Bossman Scale and appropriate interventions in the flowsheet. Outcome: Progressing Towards Goal  Note: Pressure Injury Interventions:             Activity Interventions: PT/OT evaluation         Nutrition Interventions: Document food/fluid/supplement intake

## 2021-06-04 NOTE — PROGRESS NOTES
Hospitalist Progress Note         Evette Pedroza MD          Daily Progress Note: 6/4/2021      Subjective: The patient is seen for follow  up. 77-year-old gentleman admitted for progressive worsening shortness of breath. Newly diagnosed 2 days ago with COVID-19. Notes nonproductive cough without fevers or chills. Chest x-ray showed increased reticular markings. Patient was not hypoxic upon admission. Currently on room air. Notes shortness of breath with minimal exertion.   Remains on room air  Problem List:  Problem List as of 6/4/2021 Never Reviewed        Codes Class Noted - Resolved    Pneumonia due to COVID-19 virus ICD-10-CM: U07.1, J12.82  ICD-9-CM: 480.8, 079.89  6/2/2021 - Present        Respiratory failure ([facility] [address] ICD-10-CM: J96.90  ICD-9-CM: 518.81  6/2/2021 - Present              Medications reviewed  Current Facility-Administered Medications   Medication Dose Route Frequency    insulin lispro (HUMALOG) injection   SubCUTAneous AC&HS    glucose chewable tablet 16 g  4 Tablet Oral PRN    dextrose (D50W) injection syrg 12.5-25 g  25-50 mL IntraVENous PRN    glucagon (GLUCAGEN) injection 1 mg  1 mg IntraMUSCular PRN    apixaban (ELIQUIS) tablet 5 mg  5 mg Oral Q12H    azithromycin (ZITHROMAX) 500 mg in 0.9% sodium chloride 250 mL (VIAL-MATE)  500 mg IntraVENous Q24H    zinc gluconate tablet 50 mg  1 Tablet Oral DAILY    cholecalciferol (VITAMIN D3) (1000 Units /25 mcg) tablet 2,000 Units  2,000 Units Oral DAILY    ascorbic acid (vitamin C) (VITAMIN C) tablet 500 mg  500 mg Oral BID    sodium chloride (NS) flush 5-10 mL  5-10 mL IntraVENous PRN    sodium chloride (NS) flush 5-40 mL  5-40 mL IntraVENous Q8H    sodium chloride (NS) flush 5-40 mL  5-40 mL IntraVENous PRN    ondansetron (ZOFRAN ODT) tablet 4 mg  4 mg Oral Q6H PRN    Or    ondansetron (ZOFRAN) injection 4 mg  4 mg IntraVENous Q6H PRN       Review of Systems:   A comprehensive review of systems was negative except for that written in the HPI. Objective:   Physical Exam:     Visit Vitals  BP (!) 148/95 (BP 1 Location: Left lower arm, BP Patient Position: At rest)   Pulse 66   Temp 98.2 °F (36.8 °C)   Resp 18   Ht 6' 3\" (1.905 m)   Wt 143.6 kg (316 lb 8 oz)   SpO2 93%   BMI 39.56 kg/m²      O2 Device: None (Room air)    Temp (24hrs), Av.2 °F (36.8 °C), Min:97.4 °F (36.3 °C), Max:99 °F (37.2 °C)    No intake/output data recorded. No intake/output data recorded. General:  Alert, cooperative, no distress, appears stated age. Lungs:   Clear to auscultation bilaterally. Chest wall:  No tenderness or deformity. Heart:  Regular rate and rhythm, S1, S2 normal, no murmur, click, rub or gallop. Abdomen:   Soft, non-tender. Bowel sounds normal. No masses,  No organomegaly. Extremities: Extremities normal, atraumatic, no cyanosis or edema. Pulses: 2+ and symmetric all extremities. Skin: Skin color, texture, turgor normal. No rashes or lesions   Neurologic: CNII-XII intact.  No gross sensory or motor deficits     Data Review:       Recent Days:  Recent Labs     21  0340 21  1630   WBC 5.5 10.5   HGB 11.1* 15.5   HCT 37.1 44.4    175     Recent Labs     21  0920 21  0340 21  2255 21  1630   NA  --  135* 136 134*   K  --  4.7 4.4 4.4   CL  --  102 102 99   CO2  --  22 25 26   GLU  --  125* 115* 88   BUN  --  44* 37* 32*   CREA  --  5.55* 5.55* 5.00*   CA 7.8* 7.8* 8.2* 8.1*   MG  --  1.9  --   --    PHOS  --   --  4.4  --    ALB  --  2.7* 2.6* 3.0*   TBILI  --  0.5  --  0.7   ALT  --  98*  --  98*   INR  --   --   --  1.1     Recent Labs     21  0945   PH 7.42   PCO2 33*   PO2 64*   HCO3 23   FIO2 21.0       24 Hour Results:  Recent Results (from the past 24 hour(s))   GLUCOSE, POC    Collection Time: 21 11:35 AM   Result Value Ref Range    Glucose (POC) 159 (H) 65 - 117 mg/dL    Performed by Carlos ROBIN    Collection Time: 21 12:45 PM   Result Value Ref Range    CK 4,840 (H) 39 - 308 U/L   GLUCOSE, POC    Collection Time: 06/03/21  3:53 PM   Result Value Ref Range    Glucose (POC) 124 (H) 65 - 117 mg/dL    Performed by [facility]    GLUCOSE, POC    Collection Time: 06/03/21  7:55 PM   Result Value Ref Range    Glucose (POC) 176 (H) 65 - 117 mg/dL    Performed by [facility]    GLUCOSE, POC    Collection Time: 06/04/21  7:57 AM   Result Value Ref Range    Glucose (POC) 134 (H) 65 - 117 mg/dL    Performed by Rona Ponce    CK    Collection Time: 06/04/21  8:30 AM   Result Value Ref Range    CK 1,941 (H) 39 - 308 U/L   BLOOD GAS, ARTERIAL    Collection Time: 06/04/21  9:45 AM   Result Value Ref Range    pH 7.42 7.35 - 7.45      PCO2 33 (L) 35 - 45 mmHg    PO2 64 (L) 75 - 100 mmHg    O2 SAT 92 (L) >95 %    BICARBONATE 23 22 - 26 mmol/L    BASE DEFICIT 1.9 0 - 2 mmol/L    O2 METHOD Room air      FIO2 21.0 %    SITE Left Radial      SERGO'S TEST PASS      Critical value read back JEZ RT            Assessment/     COVID-19 with pneumonitis  Acute kidney injury. No prior history of kidney damage  Acute rhabdomyolysis  Benign essential hypertension  Hyperglycemia rule out diabetes  Marked morbid obesity    Plan:  Continue supportive care including oral Decadron and antibiotics  If patient becomes hypoxic, we will consider remdesivir  Obtain stat arterial blood gas to evaluate blood oxygenation  Monitor renal functions daily. Care Plan discussed with: Patient/Family    Total time spent with patient: 30 minutes.     Danielle Delgado MD

## 2021-06-04 NOTE — PROGRESS NOTES
6/4/2021 3:23 PM      Admit Date: 6/2/2021    Admit Diagnosis: Pneumonia due to COVID-19 virus [U07.1, J12.82]      Subjective:   Patient was seen and examined. No new complaints presented to me. Appetite is improving. Bowel movements are semiformed he said   not in any pain  Review of Systems   Constitutional: Negative. HENT: Negative. Eyes: Negative. Respiratory: Negative. Cardiovascular: Negative. Gastrointestinal: Negative. Genitourinary: Negative. Musculoskeletal: Negative. Skin: Negative. Neurological: Negative. Endo/Heme/Allergies: Negative. Psychiatric/Behavioral: Negative. All other systems reviewed and are negative. Objective:      Visit Vitals  BP (!) 154/97 (BP 1 Location: Left lower arm, BP Patient Position: At rest)   Pulse (!) 57   Temp 98.7 °F (37.1 °C)   Resp 18   Ht 6' 3\" (1.905 m)   Wt 143.6 kg (316 lb 8 oz)   SpO2 95%   BMI 39.56 kg/m²       Physical Exam:  Apez Kos, Well Nourished,   No Acute Distress,   Alert & Oriented x 3, appropriate mood. Atraumatic Normcoephalic     Neck- supple, no JVD  CV- regular rate and rhythm no MRG  Lung- clear bilaterally  Abd- soft, nontender, nondistended  Ext- no edema bilaterally.   Skin- warm and dry    Recent Results (from the past 24 hour(s))   GLUCOSE, POC    Collection Time: 06/03/21  3:53 PM   Result Value Ref Range    Glucose (POC) 124 (H) 65 - 117 mg/dL    Performed by Kaleb Harp    GLUCOSE, POC    Collection Time: 06/03/21  7:55 PM   Result Value Ref Range    Glucose (POC) 176 (H) 65 - 117 mg/dL    Performed by Kaleb Harp    GLUCOSE, POC    Collection Time: 06/04/21  7:57 AM   Result Value Ref Range    Glucose (POC) 134 (H) 65 - 117 mg/dL    Performed by Jaspal Still    CK    Collection Time: 06/04/21  8:30 AM   Result Value Ref Range    CK 1,941 (H) 39 - 308 U/L   BLOOD GAS, ARTERIAL    Collection Time: 06/04/21  9:45 AM   Result Value Ref Range    pH 7.42 7.35 - 7.45      PCO2 33 (L) 35 - 45 mmHg    PO2 64 (L) 75 - 100 mmHg    O2 SAT 92 (L) >95 %    BICARBONATE 23 22 - 26 mmol/L    BASE DEFICIT 1.9 0 - 2 mmol/L    O2 METHOD Room air      FIO2 21.0 %    SITE Left Radial      SERGO'S TEST PASS      Critical value read back JEZ RT    GLUCOSE, POC    Collection Time: 06/04/21 11:25 AM   Result Value Ref Range    Glucose (POC) 143 (H) 65 - 117 mg/dL    Performed by Shanti Curtis    CK    Collection Time: 06/04/21 12:35 PM   Result Value Ref Range    CK 1,654 (H) 39 - 308 U/L        No intake or output data in the 24 hours ending 06/04/21 1523       Data Review:   Recent Labs     06/03/21  0340 06/02/21  1630   * 134*   K 4.7 4.4   BUN 44* 32*   CREA 5.55* 5.00*   WBC 5.5 10.5   HGB 11.1* 15.5   HCT 37.1 44.4    175   INR  --  1.1   HDL  --  26       No current facility-administered medications on file prior to encounter. No current outpatient medications on file prior to encounter. Assessment/Plan:     Active Problems:    Pneumonia due to COVID-19 virus (6/2/2021)      Respiratory failure ([facility] [address] (6/2/2021)        DIAGNOSES:  1. Acute kidney injury, grade 3  2. Hypertension  3. Obesity  4. Chronic anemia  5. Vitamin D deficiency  6. Proteinuria    DISCUSSION:   Renal ultrasound shows no abnormalities    PLAN:   Check urine protein creatinine ratio   Most information endocrinology suggestive of ATN   Vitamin D replacement to start          Thanks for consulting me. Please don't hesitate to contact me if any questions arise of if I can assist in any manner. This dictation was done by dragon, computer voice recognition software. Often unanticipated grammatical, syntax, phones and other interpretive errors are inadvertently transcribed. Please excuse errors that have escaped final proofreading. Please contact me if you suspect dictation or transcription errors.   Dr John Palacios  [address] [place] [address], [address], [address] [facility]  Cell Phone: [phone]  Telephone: [phone]  Fax: [phone]

## 2021-06-05 LAB
ANION GAP SERPL CALC-SCNC: 8 MMOL/L (ref 5–15)
BASOPHILS # BLD: 0 K/UL (ref 0–0.1)
BASOPHILS NFR BLD: 0 % (ref 0–1)
BUN SERPL-MCNC: 49 MG/DL (ref 6–20)
BUN/CREAT SERPL: 24 (ref 12–20)
CA-I BLD-MCNC: 8.3 MG/DL (ref 8.5–10.1)
CHLORIDE SERPL-SCNC: 111 MMOL/L (ref 97–108)
CO2 SERPL-SCNC: 23 MMOL/L (ref 21–32)
CREAT SERPL-MCNC: 2.06 MG/DL (ref 0.7–1.3)
CREAT UR-MCNC: 112 MG/DL
DIFFERENTIAL METHOD BLD: ABNORMAL
EOSINOPHIL # BLD: 0 K/UL (ref 0–0.4)
EOSINOPHIL NFR BLD: 0 % (ref 0–7)
ERYTHROCYTE [DISTWIDTH] IN BLOOD BY AUTOMATED COUNT: 14.2 % (ref 11.5–14.5)
GLUCOSE BLD STRIP.AUTO-MCNC: 102 MG/DL (ref 65–117)
GLUCOSE BLD STRIP.AUTO-MCNC: 91 MG/DL (ref 65–117)
GLUCOSE BLD STRIP.AUTO-MCNC: 93 MG/DL (ref 65–117)
GLUCOSE BLD STRIP.AUTO-MCNC: 96 MG/DL (ref 65–117)
GLUCOSE SERPL-MCNC: 96 MG/DL (ref 65–100)
HCT VFR BLD AUTO: 42 % (ref 36.6–50.3)
HGB BLD-MCNC: 15.1 G/DL (ref 12.1–17)
IMM GRANULOCYTES # BLD AUTO: 0 K/UL
IMM GRANULOCYTES NFR BLD AUTO: 0 %
LYMPHOCYTES # BLD: 1.7 K/UL (ref 0.8–3.5)
LYMPHOCYTES NFR BLD: 14 % (ref 12–49)
MCH RBC QN AUTO: 28.9 PG (ref 26–34)
MCHC RBC AUTO-ENTMCNC: 36 G/DL (ref 30–36.5)
MCV RBC AUTO: 80.5 FL (ref 80–99)
MICROALBUMIN UR-MCNC: 61.4 MG/DL
MICROALBUMIN/CREAT UR-RTO: 548 MGMALB/GCRE (ref 0–30)
MONOCYTES # BLD: 1.3 K/UL (ref 0–1)
MONOCYTES NFR BLD: 11 % (ref 5–13)
NEUTS SEG # BLD: 9 K/UL (ref 1.8–8)
NEUTS SEG NFR BLD: 75 % (ref 32–75)
NRBC # BLD: 0 K/UL (ref 0–0.01)
NRBC BLD-RTO: 0 PER 100 WBC
PERFORMED BY, TECHID: NORMAL
PLATELET # BLD AUTO: 244 K/UL (ref 150–400)
PMV BLD AUTO: 13 FL (ref 8.9–12.9)
POTASSIUM SERPL-SCNC: 4.4 MMOL/L (ref 3.5–5.1)
RBC # BLD AUTO: 5.22 M/UL (ref 4.1–5.7)
RBC MORPH BLD: ABNORMAL
SODIUM SERPL-SCNC: 142 MMOL/L (ref 136–145)
WBC # BLD AUTO: 12 K/UL (ref 4.1–11.1)

## 2021-06-05 PROCEDURE — 82043 UR ALBUMIN QUANTITATIVE: CPT

## 2021-06-05 PROCEDURE — 82962 GLUCOSE BLOOD TEST: CPT

## 2021-06-05 PROCEDURE — 74011250636 HC RX REV CODE- 250/636: Performed by: INTERNAL MEDICINE

## 2021-06-05 PROCEDURE — 85025 COMPLETE CBC W/AUTO DIFF WBC: CPT

## 2021-06-05 PROCEDURE — 36415 COLL VENOUS BLD VENIPUNCTURE: CPT

## 2021-06-05 PROCEDURE — 80048 BASIC METABOLIC PNL TOTAL CA: CPT

## 2021-06-05 PROCEDURE — 65270000029 HC RM PRIVATE

## 2021-06-05 PROCEDURE — 74011250637 HC RX REV CODE- 250/637: Performed by: INTERNAL MEDICINE

## 2021-06-05 RX ORDER — ACETAMINOPHEN 325 MG/1
650 TABLET ORAL
Status: DISCONTINUED | OUTPATIENT
Start: 2021-06-05 | End: 2021-06-06 | Stop reason: HOSPADM

## 2021-06-05 RX ADMIN — APIXABAN 5 MG: 2.5 TABLET, FILM COATED ORAL at 08:25

## 2021-06-05 RX ADMIN — AZITHROMYCIN 500 MG: 500 INJECTION, POWDER, LYOPHILIZED, FOR SOLUTION INTRAVENOUS at 12:19

## 2021-06-05 RX ADMIN — Medication 50 MG: at 08:25

## 2021-06-05 RX ADMIN — OXYCODONE HYDROCHLORIDE AND ACETAMINOPHEN 500 MG: 500 TABLET ORAL at 08:25

## 2021-06-05 RX ADMIN — Medication 10 ML: at 21:40

## 2021-06-05 RX ADMIN — OXYCODONE HYDROCHLORIDE AND ACETAMINOPHEN 500 MG: 500 TABLET ORAL at 20:47

## 2021-06-05 RX ADMIN — Medication 10 ML: at 14:00

## 2021-06-05 RX ADMIN — ACETAMINOPHEN 650 MG: 325 TABLET ORAL at 20:46

## 2021-06-05 RX ADMIN — Medication 10 ML: at 04:08

## 2021-06-05 RX ADMIN — APIXABAN 5 MG: 2.5 TABLET, FILM COATED ORAL at 20:47

## 2021-06-05 NOTE — PROGRESS NOTES
Physician Progress Note      PATIENT:               Ayaz Campbell  CSN #:                  [id number]  :                       1980  ADMIT DATE:       2021 4:11 PM  [address] [place] DATE:  RESPONDING  PROVIDER #:        Doug Farias MD          QUERY TEXT:    Pt admitted with shortness of breath. Pt noted to have Covid pneumonitis. If possible, please document in the progress notes and discharge summary if you are evaluating and /or treating any of the following: The medical record reflects the following:  Risk Factors: 48 yo male with pneumonia, Covid +, THOR  Clinical Indicators: WBC 10.5, CRP 29.1, Procalcitonin 1.6, Temp 100.9    RR 40  Treatment: IV Zithromax, Decadron    Thank you,  Lorna Alberts RN, CCDS, CPC  Options provided:  -- Sepsis, present on admission  -- No Sepsis, Covid pneumonitis only  -- Other - I will add my own diagnosis  -- Disagree - Not applicable / Not valid  -- Disagree - Clinically unable to determine / Unknown  -- Refer to Clinical Documentation Reviewer    PROVIDER RESPONSE TEXT:    This patient has sepsis which was present on admission.     Query created by: Negrito Delgdao on 2021 4:08 PM      Electronically signed by:  Doug Farias MD 2021 7:20 AM

## 2021-06-05 NOTE — PROGRESS NOTES
Nephrology Progress Note  Date:2021       Room:[facility]  Patient Gaurang Oorurke     YOB: 1980     Age:40 y.o. Subjective    Subjective:  Symptoms:  No shortness of breath, cough or chest pain. Diet:  No nausea or vomiting. Seen and evaluated at bedside, no complaints reported by the pt. No overnight events reported by primary team    UOP: 350cc        Review of Systems   Constitutional: Negative. Negative for chills, diaphoresis and fever. Respiratory: Negative for cough, chest tightness and shortness of breath. Cardiovascular: Negative for chest pain. Gastrointestinal: Negative for abdominal pain, nausea, rectal pain and vomiting. Genitourinary: Negative for dysuria. Skin: Negative for pallor. Neurological: Negative for light-headedness. Objective         Vitals Last 24 Hours:  TEMPERATURE:  Temp  Av.4 °F (36.9 °C)  Min: 98 °F (36.7 °C)  Max: 98.7 °F (37.1 °C)  RESPIRATIONS RANGE: Resp  Av  Min: 18  Max: 18  PULSE OXIMETRY RANGE: SpO2  Av.8 %  Min: 90 %  Max: 95 %  PULSE RANGE: Pulse  Av.4  Min: 46  Max: 74  BLOOD PRESSURE RANGE: Systolic (66XOS), BWK:071 , Min:124 , QIV:086   ; Diastolic (17WQH), QEO:62, Min:81, Max:100    I/O (24Hr): Intake/Output Summary (Last 24 hours) at 2021 1301  Last data filed at 2021 0824  Gross per 24 hour   Intake    Output 350 ml   Net -350 ml     Objective:  General Appearance: In no acute distress. Vital signs: (most recent): Blood pressure 133/81, pulse 73, temperature 98.6 °F (37 °C), resp. rate 18, height 6' 3\" (1.905 m), weight 140 kg (308 lb 10.3 oz), SpO2 90 %. Vital signs are normal.  No fever. Output: Producing urine. HEENT: Normal HEENT exam.    Lungs:  Normal effort. Heart: Normal rate. Abdomen: Abdomen is soft. Bowel sounds are normal.     Pulses: Distal pulses are intact.     Neurological: Patient is alert and oriented to person, place and time.      Labs/Imaging/Diagnostics    Labs:  CBC:  Recent Labs     06/05/21  0600 06/03/21  0340 06/02/21  1630   WBC 12.0* 5.5 10.5   RBC 5.22 4.46 5.40   HGB 15.1 11.1* 15.5   HCT 42.0 37.1 44.4   MCV 80.5 83.2 82.2   RDW 14.2 17.7* 14.5    326 175     CHEMISTRIES:  Recent Labs     06/05/21  0600 06/03/21  0920 06/03/21  0340 06/02/21  2255     --  135* 136   K 4.4  --  4.7 4.4   *  --  102 102   CO2 23  --  22 25   BUN 49*  --  44* 37*   CA 8.3* 7.8* 7.8* 8.2*   PHOS  --   --   --  4.4   MG  --   --  1.9  --    PT/INR:  Recent Labs     06/02/21  1630   INR 1.1     APTT:  Recent Labs     06/02/21  1630   APTT 35.0*     LIVER PROFILE:  Recent Labs     06/03/21  0340 06/02/21  1630   * 236*   ALT 98* 98*     Lab Results   Component Value Date/Time    ALT (SGPT) 98 (H) 06/03/2021 03:40 AM    AST (SGOT) 188 (H) 06/03/2021 03:40 AM    Alk. phosphatase 54 06/03/2021 03:40 AM    Bilirubin, total 0.5 06/03/2021 03:40 AM       Imaging Last 24 Hours:  No results found. Assessment//Plan   Active Problems:    Pneumonia due to COVID-19 virus (6/2/2021)      Respiratory failure ([address] (6/2/2021)      Assessment & Plan     1. Non oliguric THOR sec to pre renal azotemia - SCR peaked at 2.06  C/w strict I/O    Continue to avoid nephrotoxins as much as possible  Daily BMP for renal function and electrolyte trend  Encourage oral intake      2. HTN- c/w current management     3. COVID-19 pneumonia : on isolation as per hospital protocol    4. Acute rhabdomyolysis- likely sec to COVID-19.    CPK trend for additional monitoring      Electronically signed by Souleymane Smith MD on 6/5/2021 at 1:01 PM

## 2021-06-05 NOTE — PROGRESS NOTES
Hospitalist Progress Note         Brennan Valentine MD          Daily Progress Note: 6/5/2021      Subjective: The patient is seen for follow  up. 59-year-old gentleman admitted for progressive worsening shortness of breath. Newly diagnosed 2 days ago with COVID-19. Notes nonproductive cough without fevers or chills. Chest x-ray showed increased reticular markings. Patient was not hypoxic upon admission. Currently on room air. Creatinine remains elevated.   PTH elevated indicating likely chronic component of acute kidney injury    Problem List:  Problem List as of 6/5/2021 Never Reviewed        Codes Class Noted - Resolved    Pneumonia due to COVID-19 virus ICD-10-CM: U07.1, J12.82  ICD-9-CM: 480.8, 079.89  6/2/2021 - Present        Respiratory failure ([facility] [address] ICD-10-CM: J96.90  ICD-9-CM: 518.81  6/2/2021 - Present              Medications reviewed  Current Facility-Administered Medications   Medication Dose Route Frequency    ergocalciferol capsule 50,000 Units  50,000 Units Oral Q7D    hydrALAZINE (APRESOLINE) 20 mg/mL injection 5 mg  5 mg IntraVENous Q6H PRN    insulin lispro (HUMALOG) injection   SubCUTAneous AC&HS    glucose chewable tablet 16 g  4 Tablet Oral PRN    dextrose (D50W) injection syrg 12.5-25 g  25-50 mL IntraVENous PRN    glucagon (GLUCAGEN) injection 1 mg  1 mg IntraMUSCular PRN    apixaban (ELIQUIS) tablet 5 mg  5 mg Oral Q12H    azithromycin (ZITHROMAX) 500 mg in 0.9% sodium chloride 250 mL (VIAL-MATE)  500 mg IntraVENous Q24H    zinc gluconate tablet 50 mg  1 Tablet Oral DAILY    ascorbic acid (vitamin C) (VITAMIN C) tablet 500 mg  500 mg Oral BID    sodium chloride (NS) flush 5-10 mL  5-10 mL IntraVENous PRN    sodium chloride (NS) flush 5-40 mL  5-40 mL IntraVENous Q8H    sodium chloride (NS) flush 5-40 mL  5-40 mL IntraVENous PRN    ondansetron (ZOFRAN ODT) tablet 4 mg  4 mg Oral Q6H PRN    Or    ondansetron (ZOFRAN) injection 4 mg  4 mg IntraVENous Q6H PRN       Review of Systems:   A comprehensive review of systems was negative except for that written in the HPI. Objective:   Physical Exam:     Visit Vitals  /81 (BP 1 Location: Left upper arm, BP Patient Position: At rest)   Pulse 73   Temp 98.6 °F (37 °C)   Resp 18   Ht 6' 3\" (1.905 m)   Wt 143.6 kg (316 lb 8 oz)   SpO2 90%   BMI 39.56 kg/m²      O2 Device: None (Room air)    Temp (24hrs), Av.3 °F (36.8 °C), Min:98 °F (36.7 °C), Max:98.7 °F (37.1 °C)    701 -  1900  In: -   Out: 350 [Urine:350]   No intake/output data recorded. General:  Alert, cooperative, no distress, appears stated age. Lungs:   Clear to auscultation bilaterally. Chest wall:  No tenderness or deformity. Heart:  Regular rate and rhythm, S1, S2 normal, no murmur, click, rub or gallop. Abdomen:   Soft, non-tender. Bowel sounds normal. No masses,  No organomegaly. Extremities: Extremities normal, atraumatic, no cyanosis or edema. Pulses: 2+ and symmetric all extremities. Skin: Skin color, texture, turgor normal. No rashes or lesions   Neurologic: CNII-XII intact.  No gross sensory or motor deficits     Data Review:       Recent Days:  Recent Labs     21  0600 21  0340 21  1630   WBC 12.0* 5.5 10.5   HGB 15.1 11.1* 15.5   HCT 42.0 37.1 44.4    326 175     Recent Labs     21  0920 21  0340 21  2255 21  1630   NA  --  135* 136 134*   K  --  4.7 4.4 4.4   CL  --  102 102 99   CO2  --  22 25 26   GLU  --  125* 115* 88   BUN  --  44* 37* 32*   CREA  --  5.55* 5.55* 5.00*   CA 7.8* 7.8* 8.2* 8.1*   MG  --  1.9  --   --    PHOS  --   --  4.4  --    ALB  --  2.7* 2.6* 3.0*   TBILI  --  0.5  --  0.7   ALT  --  98*  --  98*   INR  --   --   --  1.1     Recent Labs     21  0945   PH 7.42   PCO2 33*   PO2 64*   HCO3 23   FIO2 21.0       24 Hour Results:  Recent Results (from the past 24 hour(s))   BLOOD GAS, ARTERIAL    Collection Time: 21 9:45 AM   Result Value Ref Range    pH 7.42 7.35 - 7.45      PCO2 33 (L) 35 - 45 mmHg    PO2 64 (L) 75 - 100 mmHg    O2 SAT 92 (L) >95 %    BICARBONATE 23 22 - 26 mmol/L    BASE DEFICIT 1.9 0 - 2 mmol/L    O2 METHOD Room air      FIO2 21.0 %    SITE Left Radial      SERGO'S TEST PASS      Critical value read back JEZ RT    GLUCOSE, POC    Collection Time: 06/04/21 11:25 AM   Result Value Ref Range    Glucose (POC) 143 (H) 65 - 117 mg/dL    Performed by Hong Mendez    CK    Collection Time: 06/04/21 12:35 PM   Result Value Ref Range    CK 1,654 (H) 39 - 308 U/L   GLUCOSE, POC    Collection Time: 06/04/21  3:42 PM   Result Value Ref Range    Glucose (POC) 157 (H) 65 - 117 mg/dL    Performed by Gregg Cooley    GLUCOSE, POC    Collection Time: 06/04/21  7:37 PM   Result Value Ref Range    Glucose (POC) 130 (H) 65 - 117 mg/dL    Performed by Ngozi Nicholson    CBC WITH AUTOMATED DIFF    Collection Time: 06/05/21  6:00 AM   Result Value Ref Range    WBC 12.0 (H) 4.1 - 11.1 K/uL    RBC 5.22 4.10 - 5.70 M/uL    HGB 15.1 12.1 - 17.0 g/dL    HCT 42.0 36.6 - 50.3 %    MCV 80.5 80.0 - 99.0 FL    MCH 28.9 26.0 - 34.0 PG    MCHC 36.0 30.0 - 36.5 g/dL    RDW 14.2 11.5 - 14.5 %    PLATELET 066 968 - 870 K/uL    MPV 13.0 (H) 8.9 - 12.9 FL    NRBC 0.0 0.0  WBC    ABSOLUTE NRBC 0.00 0.00 - 0.01 K/uL    NEUTROPHILS 77 (H) 32 - 75 %    LYMPHOCYTES 11 (L) 12 - 49 %    MONOCYTES 10 5 - 13 %    EOSINOPHILS 0 0 - 7 %    BASOPHILS 0 0 - 1 %    IMMATURE GRANULOCYTES 2 (H) 0 - 0.5 %    ABS. NEUTROPHILS 9.2 (H) 1.8 - 8.0 K/UL    ABS. LYMPHOCYTES 1.3 0.8 - 3.5 K/UL    ABS. MONOCYTES 1.1 (H) 0.0 - 1.0 K/UL    ABS. EOSINOPHILS 0.0 0.0 - 0.4 K/UL    ABS. BASOPHILS 0.1 0.0 - 0.1 K/UL    ABS. IMM.  GRANS. 0.3 (H) 0.00 - 0.04 K/UL    DF AUTOMATED     GLUCOSE, POC    Collection Time: 06/05/21  7:45 AM   Result Value Ref Range    Glucose (POC) 96 65 - 117 mg/dL    Performed by MARIE Mcneil/     COVID-19 with pneumonitis  Acute kidney injury. No prior history of kidney damage  Acute rhabdomyolysis. Improving  Benign essential hypertension  Hyperglycemia rule out diabetes  Marked morbid obesity    Plan:  Continue supportive care including oral Decadron and antibiotics  Discontinue Iv fluids  Monitor renal functions daily. Advise OP follow up with nephrologist  Discharge to home tomorrow      Care Plan discussed with: Patient/Family    Total time spent with patient: 30 minutes.     Kirsten Romero MD

## 2021-06-06 ENCOUNTER — APPOINTMENT (OUTPATIENT)
Dept: GENERAL RADIOLOGY | Age: 41
DRG: 871 | End: 2021-06-06
Attending: INTERNAL MEDICINE
Payer: OTHER GOVERNMENT

## 2021-06-06 VITALS
BODY MASS INDEX: 38.38 KG/M2 | RESPIRATION RATE: 19 BRPM | SYSTOLIC BLOOD PRESSURE: 129 MMHG | HEIGHT: 75 IN | OXYGEN SATURATION: 92 % | HEART RATE: 80 BPM | TEMPERATURE: 98.8 F | WEIGHT: 308.64 LBS | DIASTOLIC BLOOD PRESSURE: 92 MMHG

## 2021-06-06 LAB
ANION GAP SERPL CALC-SCNC: 6 MMOL/L (ref 5–15)
BASOPHILS # BLD: 0 K/UL (ref 0–0.1)
BASOPHILS NFR BLD: 0 % (ref 0–1)
BUN SERPL-MCNC: 29 MG/DL (ref 6–20)
BUN/CREAT SERPL: 18 (ref 12–20)
CA-I BLD-MCNC: 8 MG/DL (ref 8.5–10.1)
CHLORIDE SERPL-SCNC: 109 MMOL/L (ref 97–108)
CK SERPL-CCNC: 499 U/L (ref 39–308)
CO2 SERPL-SCNC: 25 MMOL/L (ref 21–32)
CREAT SERPL-MCNC: 1.64 MG/DL (ref 0.7–1.3)
DIFFERENTIAL METHOD BLD: ABNORMAL
EOSINOPHIL # BLD: 0 K/UL (ref 0–0.4)
EOSINOPHIL NFR BLD: 0 % (ref 0–7)
ERYTHROCYTE [DISTWIDTH] IN BLOOD BY AUTOMATED COUNT: 14.1 % (ref 11.5–14.5)
GLUCOSE BLD STRIP.AUTO-MCNC: 87 MG/DL (ref 65–117)
GLUCOSE SERPL-MCNC: 93 MG/DL (ref 65–100)
HCT VFR BLD AUTO: 42.4 % (ref 36.6–50.3)
HGB BLD-MCNC: 15 G/DL (ref 12.1–17)
IMM GRANULOCYTES # BLD AUTO: 0 K/UL
IMM GRANULOCYTES NFR BLD AUTO: 0 %
LYMPHOCYTES # BLD: 1.8 K/UL (ref 0.8–3.5)
LYMPHOCYTES NFR BLD: 19 % (ref 12–49)
MCH RBC QN AUTO: 28.4 PG (ref 26–34)
MCHC RBC AUTO-ENTMCNC: 35.4 G/DL (ref 30–36.5)
MCV RBC AUTO: 80.3 FL (ref 80–99)
MONOCYTES # BLD: 1.3 K/UL (ref 0–1)
MONOCYTES NFR BLD: 14 % (ref 5–13)
NEUTS SEG # BLD: 6.4 K/UL (ref 1.8–8)
NEUTS SEG NFR BLD: 67 % (ref 32–75)
NRBC # BLD: 0 K/UL (ref 0–0.01)
NRBC BLD-RTO: 0 PER 100 WBC
PERFORMED BY, TECHID: NORMAL
PHOSPHATE SERPL-MCNC: 3.7 MG/DL (ref 2.6–4.7)
PLATELET # BLD AUTO: 275 K/UL (ref 150–400)
PMV BLD AUTO: 11.3 FL (ref 8.9–12.9)
POTASSIUM SERPL-SCNC: 3.8 MMOL/L (ref 3.5–5.1)
RBC # BLD AUTO: 5.28 M/UL (ref 4.1–5.7)
RBC MORPH BLD: ABNORMAL
SODIUM SERPL-SCNC: 140 MMOL/L (ref 136–145)
WBC # BLD AUTO: 9.5 K/UL (ref 4.1–11.1)

## 2021-06-06 PROCEDURE — 85025 COMPLETE CBC W/AUTO DIFF WBC: CPT

## 2021-06-06 PROCEDURE — 36415 COLL VENOUS BLD VENIPUNCTURE: CPT

## 2021-06-06 PROCEDURE — 74011250637 HC RX REV CODE- 250/637: Performed by: INTERNAL MEDICINE

## 2021-06-06 PROCEDURE — 84100 ASSAY OF PHOSPHORUS: CPT

## 2021-06-06 PROCEDURE — 82962 GLUCOSE BLOOD TEST: CPT

## 2021-06-06 PROCEDURE — 82550 ASSAY OF CK (CPK): CPT

## 2021-06-06 PROCEDURE — 71045 X-RAY EXAM CHEST 1 VIEW: CPT

## 2021-06-06 PROCEDURE — 80048 BASIC METABOLIC PNL TOTAL CA: CPT

## 2021-06-06 RX ORDER — ERGOCALCIFEROL 1.25 MG/1
50000 CAPSULE ORAL
Qty: 10 CAPSULE | Refills: 0 | Status: SHIPPED | OUTPATIENT
Start: 2021-06-11 | End: 2021-08-14

## 2021-06-06 RX ORDER — DEXAMETHASONE 4 MG/1
4 TABLET ORAL
Qty: 10 TABLET | Refills: 0 | Status: SHIPPED | OUTPATIENT
Start: 2021-06-06 | End: 2021-06-16

## 2021-06-06 RX ORDER — ZINC GLUCONATE 50 MG
50 TABLET ORAL DAILY
Qty: 30 TABLET | Refills: 0 | Status: SHIPPED | OUTPATIENT
Start: 2021-06-07 | End: 2021-07-07

## 2021-06-06 RX ORDER — ASCORBIC ACID 500 MG
500 TABLET ORAL 2 TIMES DAILY
Qty: 60 TABLET | Refills: 0 | Status: SHIPPED | OUTPATIENT
Start: 2021-06-06 | End: 2021-07-06

## 2021-06-06 RX ADMIN — Medication 10 ML: at 05:47

## 2021-06-06 RX ADMIN — APIXABAN 5 MG: 2.5 TABLET, FILM COATED ORAL at 08:17

## 2021-06-06 RX ADMIN — OXYCODONE HYDROCHLORIDE AND ACETAMINOPHEN 500 MG: 500 TABLET ORAL at 08:17

## 2021-06-06 RX ADMIN — Medication 50 MG: at 08:17

## 2021-06-06 NOTE — DISCHARGE SUMMARY
Physician Discharge Summary     Patient ID:    Katheryn Leon  [id number]  73 y.o.  1980    Admit date: 6/2/2021    Discharge date : 6/6/2021    Chronic Diagnoses:    Problem List as of 6/6/2021 Never Reviewed        Codes Class Noted - Resolved    Pneumonia due to COVID-19 virus ICD-10-CM: U07.1, J12.82  ICD-9-CM: 480.8, 079.89  6/2/2021 - Present        Respiratory failure [facility]) ICD-10-CM: J96.90  ICD-9-CM: 518.81  6/2/2021 - Present          22    Final Diagnoses:   Pneumonia due to COVID-19 virus [U07.1, J12.82]  COVID-19 with pneumonitis  Acute kidney injury. No prior history of kidney damage  Acute rhabdomyolysis. Improving  Benign essential hypertension  Hyperglycemia rule out diabetes  Marked morbid obesity    Reason for Hospitalization:  Shortness of breath      Hospital Course:   62-year-old  admitted for shortness of breath. Noted with bilateral reticular markings on chest x-ray felt to be acute pneumonitis secondary to COVID-19. Vonzella Goodpasture He was treated with IV steroids and IV antibiotics with improvement. Did not require nasal oxygenation throughout hospital course. Deemed stable for discharge to home with outpatient follow-up. Discharge Medications:   Current Discharge Medication List      START taking these medications    Details   apixaban (ELIQUIS) 5 mg tablet Take 1 Tablet by mouth every twelve (12) hours for 30 days. Qty: 60 Tablet, Refills: 0  Start date: 6/6/2021, End date: 7/6/2021      ascorbic acid, vitamin C, (VITAMIN C) 500 mg tablet Take 1 Tablet by mouth two (2) times a day for 30 days. Qty: 60 Tablet, Refills: 0  Start date: 6/6/2021, End date: 7/6/2021      ergocalciferol (ERGOCALCIFEROL) 1,250 mcg (50,000 unit) capsule Take 1 Capsule by mouth every seven (7) days for 10 doses. Qty: 10 Capsule, Refills: 0  Start date: 6/11/2021, End date: 8/14/2021      zinc gluconate 50 mg tablet Take 1 Tablet by mouth daily for 30 days.   Qty: 30 Tablet, Refills: 0  Start date: 2021, End date: 2021      dexAMETHasone (Decadron) 4 mg tablet Take 4 mg by mouth Daily (before breakfast) for 10 days. Qty: 10 Tablet, Refills: 0  Start date: 2021, End date: 2021               Follow up Care:    1. None in 1-2 weeks. Please call to set up an appointment shortly after discharge. Diet:  Cardiac Diet    Disposition:  Home. Advanced Directive:   FULL    DNR      Discharge Exam:  Visit Vitals  BP (!) 129/92 (BP 1 Location: Right upper arm, BP Patient Position: Sitting)   Pulse 80   Temp 98.8 °F (37.1 °C)   Resp 19   Ht 6' 3\" (1.905 m)   Wt 140 kg (308 lb 10.3 oz)   SpO2 92%   BMI 38.58 kg/m²      O2 Device: None (Room air)    Temp (24hrs), Av.4 °F (37.4 °C), Min:98.6 °F (37 °C), Max:100.9 °F (38.3 °C)    No intake/output data recorded.  1901 -  0700  In: -   Out: 350 [Urine:350]    General:  Alert, cooperative, no distress, appears stated age. Lungs:   Clear to auscultation bilaterally. Chest wall:  No tenderness or deformity. Heart:  Regular rate and rhythm, S1, S2 normal, no murmur, click, rub or gallop. Abdomen:   Soft, non-tender. Bowel sounds normal. No masses,  No organomegaly. Extremities: Extremities normal, atraumatic, no cyanosis or edema. Pulses: 2+ and symmetric all extremities. Skin: Skin color, texture, turgor normal. No rashes or lesions   Neurologic: CNII-XII intact. No gross sensory or motor deficits         CONSULTATIONS: None    Significant Diagnostic Studies:   2021: BUN 32 mg/dL* (Ref range: 6 - 20 mg/dL); BUN 37 mg/dL* (Ref range: 6 - 20 mg/dL); Calcium 8.1 mg/dL* (Ref range: 8.5 - 10.1 mg/dL); Calcium 8.2 mg/dL* (Ref range: 8.5 - 10.1 mg/dL); CO2 26 mmol/L (Ref range: 21 - 32 mmol/L); CO2 25 mmol/L (Ref range: 21 - 32 mmol/L); Creatinine 5.00 mg/dL* (Ref range: 0.70 - 1.30 mg/dL); Creatinine 5.55 mg/dL* (Ref range: 0.70 - 1.30 mg/dL); Glucose 88 mg/dL (Ref range: 65 - 100 mg/dL);  Glucose 115 mg/dL* (Ref range: 65 - 100 mg/dL); HCT 44.4 % (Ref range: 36.6 - 50.3 %); HGB 15.5 g/dL (Ref range: 12.1 - 17.0 g/dL); Potassium 4.4 mmol/L (Ref range: 3.5 - 5.1 mmol/L); Potassium 4.4 mmol/L (Ref range: 3.5 - 5.1 mmol/L); Sodium 134 mmol/L* (Ref range: 136 - 145 mmol/L); Sodium 136 mmol/L (Ref range: 136 - 145 mmol/L)  6/3/2021: BUN 44 mg/dL* (Ref range: 6 - 20 mg/dL); Calcium 7.8 mg/dL* (Ref range: 8.5 - 10.1 mg/dL); Calcium 7.8 mg/dL* (Ref range: 8.5 - 10.1 mg/dL); CO2 22 mmol/L (Ref range: 21 - 32 mmol/L); Creatinine 5.55 mg/dL* (Ref range: 0.70 - 1.30 mg/dL); Glucose 125 mg/dL* (Ref range: 65 - 100 mg/dL); HCT 37.1 % (Ref range: 36.6 - 50.3 %); HGB 11.1 g/dL* (Ref range: 12.1 - 17.0 g/dL); Potassium 4.7 mmol/L (Ref range: 3.5 - 5.1 mmol/L); Sodium 135 mmol/L* (Ref range: 136 - 145 mmol/L)  Recent Labs     06/06/21  0504 06/05/21  0600   WBC 9.5 12.0*   HGB 15.0 15.1   HCT 42.4 42.0    244     Recent Labs     06/06/21  0504 06/05/21  0600 06/03/21  0920    142  --    K 3.8 4.4  --    * 111*  --    CO2 25 23  --    BUN 29* 49*  --    CREA 1.64* 2.06*  --    GLU 93 96  --    CA 8.0* 8.3* 7.8*     No results for input(s): ALT, AP, TBIL, TBILI, TP, ALB, GLOB, GGT, AML, LPSE in the last 72 hours. No lab exists for component: SGOT, GPT, AMYP, HLPSE  No results for input(s): INR, PTP, APTT, INREXT in the last 72 hours. No results for input(s): FE, TIBC, PSAT, FERR in the last 72 hours.    Recent Labs     06/04/21  0945   PH 7.42   PCO2 33*   PO2 64*     Recent Labs     06/06/21  0504 06/04/21  1235 06/04/21  0830   * 1,654* 1,941*     Lab Results   Component Value Date/Time    Glucose (POC) 87 06/06/2021 07:39 AM    Glucose (POC) 102 06/05/2021 07:26 PM    Glucose (POC) 91 06/05/2021 04:07 PM    Glucose (POC) 93 06/05/2021 11:25 AM    Glucose (POC) 96 06/05/2021 07:45 AM       Total Time: 35 minutes    Signed:  Danielle Delgado MD  6/6/2021  9:13 AM

## 2021-06-06 NOTE — PROGRESS NOTES
[address] June 6, 2021       RE: Altaf Engel      To Whom It May Concern,    This is to certify that Altaf Engel may may return to work on June 14th,2021. He was hospitalized at [facility] from 06/02 through 06/06 for treatment of COVID 19 with pneumonitis. Please feel free to contact my office if you have any questions or concerns. Thank you for your assistance in this matter.       Sincerely,  Walter Valadez MD

## 2021-06-09 LAB
BACTERIA SPEC CULT: NORMAL
SPECIAL REQUESTS,SREQ: NORMAL

## 2022-07-31 ENCOUNTER — HOSPITAL ENCOUNTER (EMERGENCY)
Facility: HOSPITAL | Age: 42
Discharge: HOME | End: 2022-07-31
Attending: STUDENT IN AN ORGANIZED HEALTH CARE EDUCATION/TRAINING PROGRAM
Payer: OTHER GOVERNMENT

## 2022-07-31 ENCOUNTER — APPOINTMENT (OUTPATIENT)
Dept: CT IMAGING | Facility: HOSPITAL | Age: 42
End: 2022-07-31
Payer: OTHER GOVERNMENT

## 2022-07-31 VITALS
BODY MASS INDEX: 39.17 KG/M2 | WEIGHT: 315 LBS | OXYGEN SATURATION: 97 % | DIASTOLIC BLOOD PRESSURE: 84 MMHG | HEART RATE: 73 BPM | HEIGHT: 75 IN | SYSTOLIC BLOOD PRESSURE: 137 MMHG | TEMPERATURE: 99.5 F | RESPIRATION RATE: 18 BRPM

## 2022-07-31 DIAGNOSIS — J02.0 STREP PHARYNGITIS: Primary | ICD-10-CM

## 2022-07-31 DIAGNOSIS — J03.90 TONSILLITIS: ICD-10-CM

## 2022-07-31 LAB
ANION GAP SERPL CALC-SCNC: 7 MMOL/L (ref 3–11)
BASOPHILS # BLD AUTO: 0.1 10*3/UL
BASOPHILS NFR BLD AUTO: 0.5 % (ref 0–2)
BUN SERPL-MCNC: 9 MG/DL (ref 7–25)
CALCIUM SERPL-MCNC: 9.2 MG/DL (ref 8.6–10.3)
CHLORIDE SERPL-SCNC: 102 MMOL/L (ref 98–107)
CO2 SERPL-SCNC: 29 MMOL/L (ref 21–32)
CREAT SERPL-MCNC: 1.2 MG/DL (ref 0.7–1.3)
EOSINOPHIL # BLD AUTO: 0.2 10*3/UL
EOSINOPHIL NFR BLD AUTO: 1.3 % (ref 0–3)
ERYTHROCYTE [DISTWIDTH] IN BLOOD BY AUTOMATED COUNT: 14.8 % (ref 11.5–15)
FLUAV RNA NPH QL NAA+NON-PROBE: NEGATIVE
FLUBV RNA NPH QL NAA+NON-PROBE: NEGATIVE
GFR SERPL CREATININE-BSD FRML MDRD: 78 ML/MIN/1.73M*2
GLUCOSE SERPL-MCNC: 94 MG/DL (ref 70–105)
GP B STREP AG SPEC QL: POSITIVE
HCT VFR BLD AUTO: 42.2 % (ref 38–50)
HGB BLD-MCNC: 14.8 G/DL (ref 13.2–17.2)
LYMPHOCYTES # BLD AUTO: 2 10*3/UL
LYMPHOCYTES NFR BLD AUTO: 15 % (ref 15–47)
MCH RBC QN AUTO: 28.7 PG (ref 29–34)
MCHC RBC AUTO-ENTMCNC: 35 G/DL (ref 32–36)
MCV RBC AUTO: 82.1 FL (ref 82–97)
MONOCYTES # BLD AUTO: 1.1 10*3/UL
MONOCYTES NFR BLD AUTO: 8.4 % (ref 5–13)
NEUTROPHILS # BLD AUTO: 10.2 10*3/UL
NEUTROPHILS NFR BLD AUTO: 74.8 % (ref 46–70)
PLATELET # BLD AUTO: 271 10*3/UL (ref 130–350)
PMV BLD AUTO: 8.6 FL (ref 6.9–10.8)
POTASSIUM SERPL-SCNC: 4.1 MMOL/L (ref 3.5–5.1)
RBC # BLD AUTO: 5.14 10*6/ΜL (ref 4.1–5.8)
RSV RNA NPH QL NAA+NON-PROBE: NEGATIVE
SARS-COV-2 RNA RESP QL NAA+PROBE: NEGATIVE
SODIUM SERPL-SCNC: 138 MMOL/L (ref 135–145)
WBC # BLD AUTO: 13.6 10*3/UL (ref 3.7–9.6)

## 2022-07-31 PROCEDURE — 96372 THER/PROPH/DIAG INJ SC/IM: CPT

## 2022-07-31 PROCEDURE — G1004 CDSM NDSC: HCPCS

## 2022-07-31 PROCEDURE — 87880 STREP A ASSAY W/OPTIC: CPT | Mod: QW | Performed by: STUDENT IN AN ORGANIZED HEALTH CARE EDUCATION/TRAINING PROGRAM

## 2022-07-31 PROCEDURE — 6360000200 HC RX 636 W HCPCS (ALT 250 FOR IP): Performed by: STUDENT IN AN ORGANIZED HEALTH CARE EDUCATION/TRAINING PROGRAM

## 2022-07-31 PROCEDURE — 99284 EMERGENCY DEPT VISIT MOD MDM: CPT | Performed by: STUDENT IN AN ORGANIZED HEALTH CARE EDUCATION/TRAINING PROGRAM

## 2022-07-31 PROCEDURE — 87637 SARSCOV2&INF A&B&RSV AMP PRB: CPT | Performed by: STUDENT IN AN ORGANIZED HEALTH CARE EDUCATION/TRAINING PROGRAM

## 2022-07-31 PROCEDURE — 96374 THER/PROPH/DIAG INJ IV PUSH: CPT

## 2022-07-31 PROCEDURE — 2550000100 HC RX 255: Performed by: STUDENT IN AN ORGANIZED HEALTH CARE EDUCATION/TRAINING PROGRAM

## 2022-07-31 PROCEDURE — 85025 COMPLETE CBC W/AUTO DIFF WBC: CPT | Performed by: STUDENT IN AN ORGANIZED HEALTH CARE EDUCATION/TRAINING PROGRAM

## 2022-07-31 PROCEDURE — 36415 COLL VENOUS BLD VENIPUNCTURE: CPT | Performed by: STUDENT IN AN ORGANIZED HEALTH CARE EDUCATION/TRAINING PROGRAM

## 2022-07-31 PROCEDURE — 80048 BASIC METABOLIC PNL TOTAL CA: CPT | Performed by: STUDENT IN AN ORGANIZED HEALTH CARE EDUCATION/TRAINING PROGRAM

## 2022-07-31 PROCEDURE — 96375 TX/PRO/DX INJ NEW DRUG ADDON: CPT

## 2022-07-31 RX ORDER — KETOROLAC TROMETHAMINE 15 MG/ML
15 INJECTION, SOLUTION INTRAMUSCULAR; INTRAVENOUS ONCE
Status: COMPLETED | OUTPATIENT
Start: 2022-07-31 | End: 2022-07-31

## 2022-07-31 RX ORDER — IOPAMIDOL 755 MG/ML
59 INJECTION, SOLUTION INTRAVASCULAR ONCE
Status: COMPLETED | OUTPATIENT
Start: 2022-07-31 | End: 2022-07-31

## 2022-07-31 RX ORDER — SODIUM CHLORIDE, SODIUM LACTATE, POTASSIUM CHLORIDE, AND CALCIUM CHLORIDE .6; .31; .03; .02 G/100ML; G/100ML; G/100ML; G/100ML
1000 INJECTION, SOLUTION INTRAVENOUS ONCE
Status: COMPLETED | OUTPATIENT
Start: 2022-07-31 | End: 2022-07-31

## 2022-07-31 RX ORDER — DEXAMETHASONE SODIUM PHOSPHATE 4 MG/ML
10 INJECTION, SOLUTION INTRA-ARTICULAR; INTRALESIONAL; INTRAMUSCULAR; INTRAVENOUS; SOFT TISSUE ONCE
Status: COMPLETED | OUTPATIENT
Start: 2022-07-31 | End: 2022-07-31

## 2022-07-31 RX ADMIN — DEXAMETHASONE SODIUM PHOSPHATE 10 MG: 4 INJECTION, SOLUTION INTRA-ARTICULAR; INTRALESIONAL; INTRAMUSCULAR; INTRAVENOUS; SOFT TISSUE at 12:51

## 2022-07-31 RX ADMIN — IOPAMIDOL 59 ML: 755 INJECTION, SOLUTION INTRAVENOUS at 12:05

## 2022-07-31 RX ADMIN — KETOROLAC TROMETHAMINE 15 MG: 15 INJECTION, SOLUTION INTRAMUSCULAR; INTRAVENOUS at 10:33

## 2022-07-31 RX ADMIN — PENICILLIN G BENZATHINE 2.4 MILLION UNITS: 2400000 INJECTION, SUSPENSION INTRAMUSCULAR at 13:41

## 2022-07-31 RX ADMIN — SODIUM CHLORIDE, SODIUM LACTATE, POTASSIUM CHLORIDE, AND CALCIUM CHLORIDE 1000 ML: .6; .31; .03; .02 INJECTION, SOLUTION INTRAVENOUS at 10:31

## 2022-07-31 NOTE — ED PROVIDER NOTES
Chief Complaint   Patient presents with   • Sore Throat     REPORTS DIFFICULTY SWALLOWING WATER AND FOOD - SORE THROAT ON THE LEFT SIDE.  NO BREATHING DIFFICULTY   • Dysphagia           HPI:    Patient is a 42 y.o. male who presents with sore throat and dysphagia.  Patient states approximately 5 days ago, he developed a sore throat and a fever, states his fever broke this past Tuesday, has had a persistent sore throat with feeling of fullness and dysphagia.  Pain radiates to his left ear.  He denies nausea or vomiting.  Denies cough.  Currently rates 5/10 sharp aching pain of his throat primarily on the left side.  Pain is worse with swallowing.  He denies chest pain or dyspnea.  He has been using cough drops with minimal relief.  He has a history of COVID-19.  He is not vaccinated against COVID-19.  States he had a negative home COVID-19 test.  Is a long-  He cannot identify other exacerbating alleviating factors.        Past Medical History:   Diagnosis Date   • Hypertension        Past Surgical History:   Procedure Laterality Date   • KNEE SURGERY Right    • SHOULDER SURGERY Right        Social History     Socioeconomic History   • Marital status:      Spouse name: Not on file   • Number of children: Not on file   • Years of education: Not on file   • Highest education level: Not on file   Occupational History   • Not on file   Tobacco Use   • Smoking status: Never Smoker   • Smokeless tobacco: Never Used   Vaping Use   • Vaping Use: Every day   • Substances: Nicotine   Substance and Sexual Activity   • Alcohol use: Never   • Drug use: Never   • Sexual activity: Defer   Other Topics Concern   • Not on file   Social History Narrative   • Not on file     Social Determinants of Health     Financial Resource Strain: Not on file   Food Insecurity: Not on file   Transportation Needs: Not on file   Physical Activity: Not on file   Stress: Not on file   Social Connections: Not on file   Intimate  Partner Violence: Not on file   Housing Stability: Not on file       History reviewed. No pertinent family history.    No Known Allergies    No current outpatient medications on file.      ROS:  Constitutional: Denies fever  Eyes: Denies eye pain or discharge  ENT: Sore throat.  Left ear pain.  Denies congestion  Cardiovascular: Denies chest pain.    Respiratory: Denies cough. Denies hemoptysis  GI: Dysphagia.  Odynophagia.  Denies abdominal pain.  Denies nausea or vomiting.  Denies diarrhea.  : Denies dysuria.  Denies hematuria.  Musculoskeletal: Denies back pain  Neuro: Denies headache.  Denies peripheral numbness or tingling.  Hematology: Denies easy bleeding or bruising  Dermatology: Denies rash      ED Triage Vitals   Temp Heart Rate Resp BP SpO2   07/31/22 0636 07/31/22 0636 07/31/22 0636 07/31/22 0636 07/31/22 0636   37.1 °C (98.8 °F) 78 18 141/87 94 %      Mean BP (mmHg) Temp Source Heart Rate Source Patient Position BP Location   07/31/22 0715 07/31/22 0636 -- 07/31/22 0715 --   104 Oral  Supine       FiO2 (%)       --                    Physical Exam:  Nursing note and vitals reviewed.  Constitutional: appears well-developed.   Head: Normocephalic and atraumatic.   Eyes: Pupils are equal, round. Extraocular movements intact.  ENT: Enlarged left tonsil, uvula appears midline and nonedematous.  Mildly erythematous posterior pharynx with scant amount of exudate of left tonsil.  TMs clear bilaterally.  Moist oral mucosa.  Neck: Supple, full range of motion.  Cardiovascular: Regular rate and rhythm.  No murmur, rub, or gallop.  Normal pulses.  Normal peripheral perfusion. No peripheral edema  Pulmonary/Chest: No respiratory distress.  Clear to auscultation bilaterally.  No chest wall tenderness.  Abdominal: Soft and nontender.    Back: No CVA tenderness.  No midline tenderness.  Full range of motion.  Musculoskeletal: No swelling or deformity.  Full range of motion of bilateral upper and lower  extremities.  Neurological: Alert.  CN II-XII intact.  Motor and sensation intact and equal throughout.  No focal neurologic deficits.  Skin: Skin is warm and dry. No rash noted.   Psychiatric: Normal mood and affect.           Labs:  Labs Reviewed   CBC WITH AUTO DIFFERENTIAL - Abnormal       Result Value    WBC 13.6 (*)     RBC 5.14      Hemoglobin 14.8      Hematocrit 42.2      MCV 82.1      MCH 28.7 (*)     MCHC 35.0      RDW 14.8      Platelets 271      MPV 8.6      Neutrophils% 74.8 (*)     Lymphocytes% 15.0      Monocytes% 8.4      Eosinophils% 1.3      Basophils% 0.5      ANC (auto diff) 10.20      Lymphocytes Absolute 2.00      Monocytes Absolute 1.10      Eosinophils Absolute 0.20      Basophils Absolute 0.10     RAPID STREP SCREEN - Abnormal    Strep A Screen Positive (*)    SARS/INFLUENZA/RSV QUADRUPLEX PCR - Normal    Influenza A Screen by PCR Negative      Influenza B Screen by PCR Negative      COVID-19 PCR Negative      Respiratory Syncytial Virus Screen by PCR Negative      Narrative:     Negative results do not preclude SARS-CoV-2, influenza A virus, influenza B virus and/or RSV infection and should not be used as the sole basis for treatment or other patient management decisions.  Negative results must be combined with clinical presentation, patient history, and/or epidemiological information.    Positive results are indicative of the presence of RNA from the identified virus; clinical correlation with patient history and other diagnostic information is necessary to determine patient infection status.    Testing was performed using the Xpert Xpress SARS-CoV-2/Flu/RSV RT-PCR assay ([employer]) on the GeneXpert system.  This test has received FDA Emergency Use Authorization (EUA) and performance characteristics have been verified by [employer].  Fact sheets for this Emergency Use Authorization (EUA) assay can be found at the following links:  For Patients:    https://www.fda.gov/media/558476/download  For Healthcare Providers:  https://www.fda.gov/media/055101/download       BASIC METABOLIC PANEL - Normal    Sodium 138      Potassium 4.1      Chloride 102      CO2 29      BUN 9      Creatinine 1.20      Glucose 94      Calcium 9.2      Anion Gap 7      eGFR 78      Narrative:     Calculation based on the 2021 Chronic Kidney Disease Epidemiology Collaboration (CKD-EPI) equation refit without adjustment for race.         Imaging:  CT soft tissue neck with IV contrast   Final Result   IMPRESSION:       1. Enlarged lymphoid tissue including palatine tonsils and adenoids which can be seen in the setting of tonsillitis. No abscess.                MDM:    Patient presents for evaluation of sore throat and dysphagia.  Concern for acute pharyngitis, possible strep pharyngitis.  Concern for tonsillitis with enlarged left tonsil.  Concern for developing peritonsillar abscess with fullness noted posterior to left tonsil.  Uvula does appear midline.  Will evaluate with CT soft tissue neck prior to attempting I&D.  Vital signs reviewed on arrival, afebrile and hemodynamically stable.  No evidence of sepsis at this time.  Concern for viral pharyngitis including COVID-19.  Comprehensive medical evaluation performed in the emergency department, no prior records available for review in EMR, patient is a long-.  Patient with history of hypertension and reportedly prediabetic.  Provided 1 L IV fluid bolus hydration with Toradol for pain with improvement in pain  Labs including CBC and chemistry reviewed with leukocytosis, otherwise unremarkable.  No evidence of acute kidney injury or significant acute electrolyte abnormality.  No anemia  Respiratory PCR negative including negative for COVID-19  Rapid strep screen positive, concerning for strep pharyngitis and tonsillitis.  CT soft tissue neck with IV contrast reviewed which shows evidence of enlarged tonsils consistent with  tonsillitis secondary to strep A, no evidence of tonsillar abscess or peritonsillar abscess, in agreement with radiology interpretation.  Patient provided penicillin G benzathine 2,400,000 units IM with Decadron 10 mg for treatment of strep pharyngitis.  Discharge home with education use acetaminophen and Motrin as needed for pain, educated follow-up with his primary care physician when he returns home, educated to return to the nearest emergency department as needed for any new or worsening symptoms  Patient verbalizes comfort and understanding of discharge plan.      Patient was evaluated in the emergency department today for the symptoms described in the history of present illness.  Patient was evaluated in the context of the global Covid-19 pandemic, which necessitated consideration that the patient might be at risk for infection with the SARS-coV-2 virus that causes Covid-19.  Institutional protocols and algorithms that pertain to the evaluation of patients at risk for Covid-19 are in the state of rapid change based on information released by regulatory bodies including the CDC and federal and state organizations.  These policies and algorithms were followed during the patient's care in the emergency department.    Given   ED Medication Administration from 07/31/2022 0632 to 07/31/2022 1403       Date/Time Order Dose Route Action Action by     07/31/2022 1031 LR bolus 1,000 mL 1,000 mL intravenous Bolus from Bag KANDACE Gama     07/31/2022 1033 ketorolac (TORADOL) injection 15 mg 15 mg intravenous Given KANDACE Gama     07/31/2022 1205 iopamidoL (ISOVUE-370) 76 % injection 59 mL 59 mL intravenous Given KANDACE Truong     07/31/2022 1341 penicillin G benzathine (BICILLIN-LA) injection 2.4 Million Units 2.4 Million Units intramuscular Given KANDACE Gama     07/31/2022 1251 dexamethasone (DECADRON) injection 10 mg 10 mg intravenous Given ARIANE Szymanski        Clinical Impression:    Strep  pharyngitis  Tonsillitis  Dysphagia  Obesity  COVID-19 ruled out      Final diagnoses:   [J02.0] Strep pharyngitis   [J03.90] Tonsillitis           A voice recognition program was used to aid in documentation of this record.  Sometimes words are not printed exactly as they were spoken.  While efforts were made to carefully edit and correct any inaccuracies, some areas may be present; please take these into context.  Please contact the provider if areas are identified.     Keven Enriquez MD  08/01/22 [id number]

## 2022-07-31 NOTE — DISCHARGE INSTRUCTIONS
You were evaluated for a sore throat in the emergency department.  You have strep throat.  Your CT scan shows no evidence of a tonsillar or peritonsillar abscess.  Your lab work is otherwise reassuring.  You do not have COVID-19, influenza or RSV.  You were provided a one-time dose of antibiotics with penicillin injection.  You were provided a dose of steroids, Decadron, which should provide improved inflammation and pain over the next 3 to 4 days.  Continue taking acetaminophen and Motrin as needed for pain.  Stay well-hydrated.  Follow-up with your primary care physician when you return home  Return to the emergency department as needed for any new or worsening symptoms